# Patient Record
Sex: MALE | Race: WHITE | NOT HISPANIC OR LATINO
[De-identification: names, ages, dates, MRNs, and addresses within clinical notes are randomized per-mention and may not be internally consistent; named-entity substitution may affect disease eponyms.]

---

## 2017-06-22 PROBLEM — Z00.00 ENCOUNTER FOR PREVENTIVE HEALTH EXAMINATION: Status: ACTIVE | Noted: 2017-06-22

## 2017-06-23 ENCOUNTER — APPOINTMENT (OUTPATIENT)
Dept: DERMATOLOGY | Facility: CLINIC | Age: 67
End: 2017-06-23

## 2021-01-12 ENCOUNTER — APPOINTMENT (OUTPATIENT)
Dept: PULMONOLOGY | Facility: CLINIC | Age: 71
End: 2021-01-12
Payer: MEDICARE

## 2021-01-12 ENCOUNTER — RX CHANGE (OUTPATIENT)
Age: 71
End: 2021-01-12

## 2021-01-12 VITALS — HEART RATE: 65 BPM | OXYGEN SATURATION: 95 %

## 2021-01-12 VITALS
WEIGHT: 187 LBS | SYSTOLIC BLOOD PRESSURE: 126 MMHG | HEIGHT: 67 IN | BODY MASS INDEX: 29.35 KG/M2 | DIASTOLIC BLOOD PRESSURE: 70 MMHG

## 2021-01-12 DIAGNOSIS — Z82.5 FAMILY HISTORY OF ASTHMA AND OTHER CHRONIC LOWER RESPIRATORY DISEASES: ICD-10-CM

## 2021-01-12 DIAGNOSIS — R09.82 POSTNASAL DRIP: ICD-10-CM

## 2021-01-12 DIAGNOSIS — R09.89 OTHER SPECIFIED SYMPTOMS AND SIGNS INVOLVING THE CIRCULATORY AND RESPIRATORY SYSTEMS: ICD-10-CM

## 2021-01-12 DIAGNOSIS — I10 ESSENTIAL (PRIMARY) HYPERTENSION: ICD-10-CM

## 2021-01-12 DIAGNOSIS — I49.9 CARDIAC ARRHYTHMIA, UNSPECIFIED: ICD-10-CM

## 2021-01-12 DIAGNOSIS — Z80.42 FAMILY HISTORY OF MALIGNANT NEOPLASM OF PROSTATE: ICD-10-CM

## 2021-01-12 PROCEDURE — 99204 OFFICE O/P NEW MOD 45 MIN: CPT | Mod: 25

## 2021-01-12 PROCEDURE — G0296 VISIT TO DETERM LDCT ELIG: CPT

## 2021-01-12 RX ORDER — LOSARTAN POTASSIUM AND HYDROCHLOROTHIAZIDE 12.5; 1 MG/1; MG/1
100-12.5 TABLET ORAL
Refills: 0 | Status: ACTIVE | COMMUNITY

## 2021-01-12 RX ORDER — METOPROLOL TARTRATE 75 MG/1
75 TABLET, FILM COATED ORAL
Refills: 0 | Status: ACTIVE | COMMUNITY

## 2021-01-12 RX ORDER — PNV NO.95/FERROUS FUM/FOLIC AC 28MG-0.8MG
TABLET ORAL
Refills: 0 | Status: ACTIVE | COMMUNITY

## 2021-01-12 RX ORDER — AZELASTINE HYDROCHLORIDE AND FLUTICASONE PROPIONATE 137; 50 UG/1; UG/1
137-50 SPRAY, METERED NASAL TWICE DAILY
Qty: 1 | Refills: 5 | Status: DISCONTINUED | COMMUNITY
Start: 2021-01-12 | End: 2021-01-12

## 2021-01-12 NOTE — DISCUSSION/SUMMARY
[FreeTextEntry1] : 70-year-old male with a significant history of tobacco use, and questionable history of COPD, seen today for chronic cough over the last 3 months. Most likely etiology is that of underlying COPD complicated by possible sinus disease. Differential diagnosis does include cardiogenic carcinoma, interstitial lung disease. No evidence to suggest congestive heart failure

## 2021-01-12 NOTE — HISTORY OF PRESENT ILLNESS
[TextBox_4] : 70-year-old male with a greater than 75 pack year history of cigarette smoking. DCD 8 years ago, seen today for complaints of a chronic cough for the past 3 months. Patient noted the onset of cough with recent weather change to cold weather associated with mild shortness of breath since October. Skull for sporadic, productive of a clear white sputum with some associated postnasal drip. He does note slight change with position. Despite a history of ventricular arrhythmias. He has tried Primatene Mist which he states does improve his symptoms. Has no history of chest pains, palpitations, hemoptysis, leg edema, paroxysmal nocturnal dyspnea, or orthopnea. He has had no change in home environment or exposures. History is positive for a distant diagnosis of COPD alone, never treated, as well as pulmonary nodules seen on x-ray Many years ago [ESS] : 0

## 2021-01-12 NOTE — CONSULT LETTER
[Dear  ___] : Dear  [unfilled], [Consult Letter:] : I had the pleasure of evaluating your patient, [unfilled]. [Please see my note below.] : Please see my note below. [Consult Closing:] : Thank you very much for allowing me to participate in the care of this patient.  If you have any questions, please do not hesitate to contact me. [Sincerely,] : Sincerely, [FreeTextEntry3] : Peter Reyes MD FCCP\par Pulmonary/Critical Care/Sleep Medicine\par Department of Internal Medicine\par \par [facility]\par

## 2021-01-12 NOTE — COUNSELING
[FreeTextEntry2] : former smoker [ - Annual Lung Cancer Screening/Share Decision Making Discussion] : Annual Lung Cancer Screening/Share Decision Making Discussion. (I have advised this patient to have a Low Dose CT (LDCT) scan of the lungs and have discussed the following with the patient in a shared decision making discussion:   Benefits of Detection and Early Treatment: There is adequate evidence that annual screening for lung cancer with LDCT in a population of high-risk persons can prevent a substantial number of lung cancer–related deaths. The magnitude of benefit depends on the individual patient's risk for lung cancer, as those who are at highest risk are most likely to benefit. Screening cannot prevent most lung cancer–related deaths, and does not replace smoking cessation. Harms of Detection and Early Intervention and Treatment: The harms associated with LDCT screening include false-negative and false-positive results, incidental findings, over diagnosis, and radiation exposure. False-positive LDCT results occur in a substantial proportion of screened persons; 95% of all positive results do not lead to a diagnosis of cancer. In a high-quality screening program, further imaging can resolve most false-positive results; however, some patients may require invasive procedures. Radiation harms, including cancer resulting from cumulative exposure to radiation, vary depending on the age at the start of screening; the number of scans received; and the person's exposure to other sources of radiation, particularly other medical imaging.)

## 2021-01-13 ENCOUNTER — NON-APPOINTMENT (OUTPATIENT)
Age: 71
End: 2021-01-13

## 2021-01-13 VITALS — HEIGHT: 67 IN | BODY MASS INDEX: 29.03 KG/M2 | WEIGHT: 185 LBS

## 2021-01-13 NOTE — HISTORY OF PRESENT ILLNESS
[TextBox_13] : Referred by Dr. Peter Reyes.\par \par Mr. RAPHAEL is a 70 year old male with a history of HTN, COPD, s/p PPM/ICD for heart block and Vtach.\par \par He was called to review eligibility for Low-Dose CT lung cancer screening.  Reviewed and confirmed that the patient meets screening eligibility criteria:\par \par 70 years old \par \par Smoking Status: Former smoker\par \par Number of pack(s) per day: 1.5\par Number of years smoked: 45\par Number of pack years smokin.5\par \par Number of years since quitting smokin\par Quit year: \par \par Mr. RAPHAEL denies any symptoms of lung cancer, including new cough, change in cough, hemoptysis, and unintentional weight loss.\par \par Mr. RAPHAEL denies any personal history of lung cancer.  No lung cancer in a first degree relative.

## 2021-01-20 ENCOUNTER — OUTPATIENT (OUTPATIENT)
Dept: OUTPATIENT SERVICES | Facility: HOSPITAL | Age: 71
LOS: 1 days | End: 2021-01-20
Payer: MEDICARE

## 2021-01-20 ENCOUNTER — APPOINTMENT (OUTPATIENT)
Dept: CT IMAGING | Facility: CLINIC | Age: 71
End: 2021-01-20
Payer: MEDICARE

## 2021-01-20 ENCOUNTER — TRANSCRIPTION ENCOUNTER (OUTPATIENT)
Age: 71
End: 2021-01-20

## 2021-01-20 ENCOUNTER — RESULT REVIEW (OUTPATIENT)
Age: 71
End: 2021-01-20

## 2021-01-20 DIAGNOSIS — Z87.891 PERSONAL HISTORY OF NICOTINE DEPENDENCE: ICD-10-CM

## 2021-01-20 PROCEDURE — 71271 CT THORAX LUNG CANCER SCR C-: CPT | Mod: 26,MH

## 2021-01-20 PROCEDURE — 71271 CT THORAX LUNG CANCER SCR C-: CPT

## 2021-01-28 ENCOUNTER — APPOINTMENT (OUTPATIENT)
Dept: PULMONOLOGY | Facility: CLINIC | Age: 71
End: 2021-01-28
Payer: MEDICARE

## 2021-01-28 PROCEDURE — 99213 OFFICE O/P EST LOW 20 MIN: CPT | Mod: 95

## 2021-01-28 NOTE — ASSESSMENT
[FreeTextEntry1] : This could represent sinusitis or upper respiratory infection. I told him to take Mucinex to loosen his sputum but also I've prescribed doxycycline 100 mg b.i.d. for a week. He will be following up in this office shortly for pulmonary function studies.

## 2021-01-28 NOTE — HISTORY OF PRESENT ILLNESS
[Home] : at home, [unfilled] , at the time of the visit. [Medical Office: ([facility])___] : at the medical office located in  [Verbal consent obtained from patient] : the patient, [unfilled] [TextBox_4] : The patient has been complaining of ongoing cough. His nose is better on Flonase and his breathing is better with Combivent but the cough has continued. It's mostly yellow although there is occasional brown spots. He does have a history of sinus disease.\par \par His CT scan does show some areas of mucus impaction in the left upper lobe and the right lower lobe.\par \par

## 2021-02-19 DIAGNOSIS — Z01.818 ENCOUNTER FOR OTHER PREPROCEDURAL EXAMINATION: ICD-10-CM

## 2021-02-20 ENCOUNTER — APPOINTMENT (OUTPATIENT)
Dept: DISASTER EMERGENCY | Facility: CLINIC | Age: 71
End: 2021-02-20

## 2021-02-21 LAB — SARS-COV-2 N GENE NPH QL NAA+PROBE: NOT DETECTED

## 2021-02-24 ENCOUNTER — APPOINTMENT (OUTPATIENT)
Dept: PULMONOLOGY | Facility: CLINIC | Age: 71
End: 2021-02-24
Payer: MEDICARE

## 2021-02-24 VITALS — BODY MASS INDEX: 29.98 KG/M2 | HEIGHT: 67 IN | WEIGHT: 191 LBS | TEMPERATURE: 97.7 F

## 2021-02-24 PROCEDURE — 94010 BREATHING CAPACITY TEST: CPT

## 2021-02-24 PROCEDURE — 94727 GAS DIL/WSHOT DETER LNG VOL: CPT

## 2021-02-24 PROCEDURE — 85018 HEMOGLOBIN: CPT | Mod: QW

## 2021-02-24 PROCEDURE — 94729 DIFFUSING CAPACITY: CPT

## 2021-03-02 ENCOUNTER — APPOINTMENT (OUTPATIENT)
Dept: PULMONOLOGY | Facility: CLINIC | Age: 71
End: 2021-03-02
Payer: MEDICARE

## 2021-03-02 PROCEDURE — 99443: CPT | Mod: 95

## 2021-04-14 ENCOUNTER — APPOINTMENT (OUTPATIENT)
Dept: PULMONOLOGY | Facility: CLINIC | Age: 71
End: 2021-04-14
Payer: MEDICARE

## 2021-04-14 VITALS
HEART RATE: 62 BPM | SYSTOLIC BLOOD PRESSURE: 122 MMHG | DIASTOLIC BLOOD PRESSURE: 70 MMHG | WEIGHT: 194 LBS | BODY MASS INDEX: 30.38 KG/M2 | OXYGEN SATURATION: 95 %

## 2021-04-14 PROCEDURE — 99215 OFFICE O/P EST HI 40 MIN: CPT

## 2021-04-14 NOTE — DISCUSSION/SUMMARY
[COPD] : chronic obstructive pulmonary disease [Improving] : improving [Responding to Treatment] : responding to treatment [None] : There are no changes in medication management [Stage III (Severe)] : stage III (severe) [FreeTextEntry1] : Chest CT demonstrated a 3 mm nodule, as well as possible mucoid impacted airways. Followup CAT scan will be performed in 6 months. Doubt malignancy

## 2021-04-14 NOTE — CONSULT LETTER
[Dear  ___] : Dear  [unfilled], [Please see my note below.] : Please see my note below. [Consult Letter:] : I had the pleasure of evaluating your patient, [unfilled]. [Consult Closing:] : Thank you very much for allowing me to participate in the care of this patient.  If you have any questions, please do not hesitate to contact me. [Sincerely,] : Sincerely, [FreeTextEntry3] : Peter Reyes MD FCCP\par Pulmonary/Critical Care/Sleep Medicine\par Department of Internal Medicine\par \par [facility]\par

## 2021-04-14 NOTE — HISTORY OF PRESENT ILLNESS
[Doing Well] : doing well [Difficulty Breathing During Exertion] : improved dyspnea on exertion [Feelings Of Weakness On Exertion] : improved exercise intolerance [Cough] : denies coughing [Coughing Up Sputum] : denies coughing up sputum [Wheezing] : denies wheezing [Regional Soft Tissue Swelling Both Lower Extremities] : denies lower extremity edema [Adherent] : the patient is adherent with ~his/her~ medication regimen [None] : None [Former] : former [>= 30 pack years] : >= 30 pack years [de-identified] : Htn [YearQuit] : 2012

## 2021-07-09 ENCOUNTER — APPOINTMENT (OUTPATIENT)
Dept: DISASTER EMERGENCY | Facility: CLINIC | Age: 71
End: 2021-07-09

## 2021-07-12 ENCOUNTER — OUTPATIENT (OUTPATIENT)
Dept: OUTPATIENT SERVICES | Facility: HOSPITAL | Age: 71
LOS: 1 days | End: 2021-07-12
Payer: MEDICARE

## 2021-07-12 ENCOUNTER — APPOINTMENT (OUTPATIENT)
Dept: CT IMAGING | Facility: CLINIC | Age: 71
End: 2021-07-12
Payer: MEDICARE

## 2021-07-12 DIAGNOSIS — R91.8 OTHER NONSPECIFIC ABNORMAL FINDING OF LUNG FIELD: ICD-10-CM

## 2021-07-12 PROCEDURE — 71250 CT THORAX DX C-: CPT | Mod: 26,ME

## 2021-07-12 PROCEDURE — G1004: CPT

## 2021-07-12 PROCEDURE — 71250 CT THORAX DX C-: CPT

## 2021-07-13 ENCOUNTER — NON-APPOINTMENT (OUTPATIENT)
Age: 71
End: 2021-07-13

## 2021-07-14 ENCOUNTER — APPOINTMENT (OUTPATIENT)
Dept: PULMONOLOGY | Facility: CLINIC | Age: 71
End: 2021-07-14
Payer: MEDICARE

## 2021-07-14 VITALS — SYSTOLIC BLOOD PRESSURE: 120 MMHG | DIASTOLIC BLOOD PRESSURE: 68 MMHG | HEART RATE: 61 BPM | OXYGEN SATURATION: 96 %

## 2021-07-14 VITALS — WEIGHT: 196 LBS | HEIGHT: 67 IN | BODY MASS INDEX: 30.76 KG/M2

## 2021-07-14 PROCEDURE — 94010 BREATHING CAPACITY TEST: CPT

## 2021-07-14 PROCEDURE — 99215 OFFICE O/P EST HI 40 MIN: CPT | Mod: 25

## 2021-07-14 NOTE — HISTORY OF PRESENT ILLNESS
[Former] : former [>= 30 pack years] : >= 30 pack years [Doing Well] : doing well [Difficulty Breathing During Exertion] : improved dyspnea on exertion [Feelings Of Weakness On Exertion] : improved exercise intolerance [Cough] : denies coughing [Coughing Up Sputum] : denies coughing up sputum [Wheezing] : denies wheezing [Regional Soft Tissue Swelling Both Lower Extremities] : denies lower extremity edema [None] : None [Adherent] : the patient is adherent with ~his/her~ medication regimen [YearQuit] : 2012 [de-identified] : Htn

## 2021-07-14 NOTE — DISCUSSION/SUMMARY
[COPD] : chronic obstructive pulmonary disease [Improving] : improving [Responding to Treatment] : responding to treatment [Stage III (Severe)] : stage III (severe) [None] : There are no changes in medication management [FreeTextEntry1] : CCT  demonstrates new RUL lesion probably inflammatory. Short term f/u 3 months

## 2021-07-14 NOTE — CONSULT LETTER
[Dear  ___] : Dear  [unfilled], [Consult Letter:] : I had the pleasure of evaluating your patient, [unfilled]. [Please see my note below.] : Please see my note below. [Consult Closing:] : Thank you very much for allowing me to participate in the care of this patient.  If you have any questions, please do not hesitate to contact me. [Sincerely,] : Sincerely, [FreeTextEntry3] : Peter Reyes MD FCCP\par Pulmonary/Critical Care/Sleep Medicine\par Department of Internal Medicine\par \par [facility]\par

## 2021-10-14 ENCOUNTER — OUTPATIENT (OUTPATIENT)
Dept: OUTPATIENT SERVICES | Facility: HOSPITAL | Age: 71
LOS: 1 days | End: 2021-10-14

## 2021-10-14 ENCOUNTER — APPOINTMENT (OUTPATIENT)
Dept: CT IMAGING | Facility: CLINIC | Age: 71
End: 2021-10-14
Payer: MEDICARE

## 2021-10-14 ENCOUNTER — NON-APPOINTMENT (OUTPATIENT)
Age: 71
End: 2021-10-14

## 2021-10-14 DIAGNOSIS — J44.9 CHRONIC OBSTRUCTIVE PULMONARY DISEASE, UNSPECIFIED: ICD-10-CM

## 2021-10-14 PROCEDURE — 71250 CT THORAX DX C-: CPT | Mod: 26

## 2022-01-18 ENCOUNTER — APPOINTMENT (OUTPATIENT)
Dept: PULMONOLOGY | Facility: CLINIC | Age: 72
End: 2022-01-18
Payer: MEDICARE

## 2022-01-18 VITALS
RESPIRATION RATE: 16 BRPM | SYSTOLIC BLOOD PRESSURE: 140 MMHG | BODY MASS INDEX: 31.01 KG/M2 | WEIGHT: 198 LBS | HEART RATE: 67 BPM | OXYGEN SATURATION: 96 % | DIASTOLIC BLOOD PRESSURE: 70 MMHG

## 2022-01-18 PROCEDURE — 99215 OFFICE O/P EST HI 40 MIN: CPT | Mod: 25

## 2022-01-18 PROCEDURE — G0296 VISIT TO DETERM LDCT ELIG: CPT

## 2022-01-18 RX ORDER — IPRATROPIUM BROMIDE AND ALBUTEROL 20; 100 UG/1; UG/1
20-100 SPRAY, METERED RESPIRATORY (INHALATION) 4 TIMES DAILY
Qty: 1 | Refills: 5 | Status: DISCONTINUED | COMMUNITY
Start: 2021-01-12 | End: 2022-01-18

## 2022-01-18 NOTE — HISTORY OF PRESENT ILLNESS
[Former] : former [>= 30 pack years] : >= 30 pack years [Lung Cancer Screening] : Patient underwent lung cancer screening [4A] : 4A   [2] : 2 [Doing Well] : doing well [Cough] : denies coughing [Coughing Up Sputum] : denies coughing up sputum [Wheezing] : denies wheezing [Regional Soft Tissue Swelling Both Lower Extremities] : denies lower extremity edema [None] : None [Adherent] : the patient is adherent with ~his/her~ medication regimen [Difficulty Breathing During Exertion] : stable dyspnea on exertion [Feelings Of Weakness On Exertion] : stable exercise intolerance [YearQuit] : 2012 [TextBox_12] : 1/20/2021 [TextBox_27] : 7/13/2021 [TextBox_42] : 10/14/21 [TextBox_57] : , Interval resolution of tree-in-bud opacities within the left upper lobe and subsolid opacity within the right upper lobe.  Few central lobular micronodules representing impacted distal airways.  Stable small fissural subpleural nodules. [de-identified] : Htn [de-identified] : two flu-like illnesses since last seen.

## 2022-01-18 NOTE — CONSULT LETTER
[Dear  ___] : Dear  [unfilled], [Consult Letter:] : I had the pleasure of evaluating your patient, [unfilled]. [Please see my note below.] : Please see my note below. [Consult Closing:] : Thank you very much for allowing me to participate in the care of this patient.  If you have any questions, please do not hesitate to contact me. [Sincerely,] : Sincerely, [FreeTextEntry3] : Peter Reyes MD FCCP\par Pulmonary/Critical Care/Sleep Medicine\par Department of Internal Medicine\par \par [facility]\par

## 2022-01-18 NOTE — DISCUSSION/SUMMARY
[COPD] : chronic obstructive pulmonary disease [Stable] : stable [Responding to Treatment] : responding to treatment [Stage III (Severe)] : stage III (severe) [None] : There are no changes in medication management [FreeTextEntry1] : Chest CT demonstrates resolution of previously seen infiltrates.  Emphysema and pulmonary nodules are stable.  Most likely postinflammatory in nature\par \par Due to the patient's history of prior tobacco use they fulfill criteria for low dose, lung cancer screening. This method was described to the patient with criteria for greater than 20 pack years of smoking, over the age of 50, and screening for 15 years following cessation of tobacco use or until age 80\par

## 2022-02-16 ENCOUNTER — RX RENEWAL (OUTPATIENT)
Age: 72
End: 2022-02-16

## 2022-07-11 ENCOUNTER — APPOINTMENT (OUTPATIENT)
Dept: PULMONOLOGY | Facility: CLINIC | Age: 72
End: 2022-07-11

## 2022-07-11 VITALS — SYSTOLIC BLOOD PRESSURE: 138 MMHG | DIASTOLIC BLOOD PRESSURE: 74 MMHG | HEART RATE: 62 BPM | OXYGEN SATURATION: 98 %

## 2022-07-11 VITALS — BODY MASS INDEX: 31.39 KG/M2 | HEIGHT: 67 IN | WEIGHT: 200 LBS

## 2022-07-11 LAB — SARS-COV-2 N GENE NPH QL NAA+PROBE: NOT DETECTED

## 2022-07-11 PROCEDURE — 94729 DIFFUSING CAPACITY: CPT

## 2022-07-11 PROCEDURE — 85018 HEMOGLOBIN: CPT | Mod: QW

## 2022-07-11 PROCEDURE — 94010 BREATHING CAPACITY TEST: CPT

## 2022-07-11 PROCEDURE — 99214 OFFICE O/P EST MOD 30 MIN: CPT | Mod: 25

## 2022-07-11 PROCEDURE — 94727 GAS DIL/WSHOT DETER LNG VOL: CPT

## 2022-07-11 NOTE — CONSULT LETTER
[Dear  ___] : Dear  [unfilled], [Consult Letter:] : I had the pleasure of evaluating your patient, [unfilled]. [Please see my note below.] : Please see my note below. [Consult Closing:] : Thank you very much for allowing me to participate in the care of this patient.  If you have any questions, please do not hesitate to contact me. [Sincerely,] : Sincerely, [FreeTextEntry3] : Peter Reyes MD FCCP\par Pulmonary/Critical Care/Sleep Medicine\par Department of Internal Medicine\par \par [facility]\par

## 2022-07-11 NOTE — HISTORY OF PRESENT ILLNESS
[Former] : former [Lung Cancer Screening] : Patient underwent lung cancer screening [4A] : 4A   [2] : 2 [Doing Well] : doing well [Difficulty Breathing During Exertion] : stable dyspnea on exertion [Feelings Of Weakness On Exertion] : stable exercise intolerance [Cough] : denies coughing [Coughing Up Sputum] : denies coughing up sputum [Wheezing] : denies wheezing [Regional Soft Tissue Swelling Both Lower Extremities] : denies lower extremity edema [None] : None [Adherent] : the patient is adherent with ~his/her~ medication regimen [YearQuit] : 2012 [TextBox_12] : 1/20/2021 [TextBox_27] : 7/13/2021 [TextBox_42] : 10/14/21 [TextBox_57] : , Interval resolution of tree-in-bud opacities within the left upper lobe and subsolid opacity within the right upper lobe.  Few central lobular micronodules representing impacted distal airways.  Stable small fissural subpleural nodules. [de-identified] : Htn [de-identified] : two flu-like illnesses since last seen.

## 2022-10-12 ENCOUNTER — NON-APPOINTMENT (OUTPATIENT)
Age: 72
End: 2022-10-12

## 2022-10-12 VITALS — BODY MASS INDEX: 31.39 KG/M2 | WEIGHT: 200 LBS | HEIGHT: 67 IN

## 2022-10-12 NOTE — HISTORY OF PRESENT ILLNESS
[Former] : Former [TextBox_13] : Referred by Dr. Peter Reyes.\par \par Mr. RAPHAEL is a 72 year old male with a history of HTN, COPD, s/p PPM/ICD.  Reviewed and confirmed that the patient meets screening eligibility criteria:\par \par 72 years old \par \par Smoking Status: Former smoker \par \par Number of pack(s) per day: 1.5\par Number of years smoked: 45\par Number of pack years smokin\par \par Number of years since quitting smoking: 10\par Quit year: \par \par No symptoms of lung cancer, including new cough, change in cough, hemoptysis, and unintentional weight loss.\par \par No personal history of lung cancer.  No lung cancer in a first degree relative. No history of lung disease or occupational exposures. [YearQuit] : 2012 [PacksperDay] : 1.5 [N_Years] : 45 [PacksperYear] : 68

## 2022-10-18 ENCOUNTER — APPOINTMENT (OUTPATIENT)
Dept: CT IMAGING | Facility: CLINIC | Age: 72
End: 2022-10-18

## 2022-10-18 ENCOUNTER — OUTPATIENT (OUTPATIENT)
Dept: OUTPATIENT SERVICES | Facility: HOSPITAL | Age: 72
LOS: 1 days | End: 2022-10-18

## 2022-10-18 DIAGNOSIS — Z87.891 PERSONAL HISTORY OF NICOTINE DEPENDENCE: ICD-10-CM

## 2022-10-18 PROCEDURE — 71271 CT THORAX LUNG CANCER SCR C-: CPT | Mod: 26

## 2022-10-24 ENCOUNTER — NON-APPOINTMENT (OUTPATIENT)
Age: 72
End: 2022-10-24

## 2022-10-25 ENCOUNTER — RX RENEWAL (OUTPATIENT)
Age: 72
End: 2022-10-25

## 2023-01-01 ENCOUNTER — RX CHANGE (OUTPATIENT)
Age: 73
End: 2023-01-01

## 2023-01-01 ENCOUNTER — APPOINTMENT (OUTPATIENT)
Dept: PULMONOLOGY | Facility: CLINIC | Age: 73
End: 2023-01-01
Payer: MEDICARE

## 2023-01-01 ENCOUNTER — APPOINTMENT (OUTPATIENT)
Dept: ORTHOPEDIC SURGERY | Facility: CLINIC | Age: 73
End: 2023-01-01
Payer: MEDICARE

## 2023-01-01 ENCOUNTER — NON-APPOINTMENT (OUTPATIENT)
Age: 73
End: 2023-01-01

## 2023-01-01 ENCOUNTER — OUTPATIENT (OUTPATIENT)
Dept: OUTPATIENT SERVICES | Facility: HOSPITAL | Age: 73
LOS: 1 days | End: 2023-01-01

## 2023-01-01 ENCOUNTER — APPOINTMENT (OUTPATIENT)
Dept: CT IMAGING | Facility: CLINIC | Age: 73
End: 2023-01-01
Payer: MEDICARE

## 2023-01-01 VITALS — BODY MASS INDEX: 29.4 KG/M2 | HEIGHT: 68 IN | WEIGHT: 194 LBS

## 2023-01-01 VITALS
HEART RATE: 60 BPM | DIASTOLIC BLOOD PRESSURE: 68 MMHG | SYSTOLIC BLOOD PRESSURE: 130 MMHG | OXYGEN SATURATION: 95 % | RESPIRATION RATE: 16 BRPM

## 2023-01-01 VITALS — HEIGHT: 68 IN | WEIGHT: 194 LBS | BODY MASS INDEX: 29.4 KG/M2

## 2023-01-01 DIAGNOSIS — M25.512 PAIN IN LEFT SHOULDER: ICD-10-CM

## 2023-01-01 DIAGNOSIS — R91.8 OTHER NONSPECIFIC ABNORMAL FINDING OF LUNG FIELD: ICD-10-CM

## 2023-01-01 DIAGNOSIS — M75.90 BURSITIS OF UNSPECIFIED SHOULDER: ICD-10-CM

## 2023-01-01 DIAGNOSIS — J44.9 CHRONIC OBSTRUCTIVE PULMONARY DISEASE, UNSPECIFIED: ICD-10-CM

## 2023-01-01 DIAGNOSIS — M75.50 BURSITIS OF UNSPECIFIED SHOULDER: ICD-10-CM

## 2023-01-01 PROCEDURE — 99214 OFFICE O/P EST MOD 30 MIN: CPT | Mod: 25

## 2023-01-01 PROCEDURE — 99204 OFFICE O/P NEW MOD 45 MIN: CPT | Mod: 25

## 2023-01-01 PROCEDURE — G0296 VISIT TO DETERM LDCT ELIG: CPT

## 2023-01-01 PROCEDURE — 94010 BREATHING CAPACITY TEST: CPT

## 2023-01-01 PROCEDURE — 71271 CT THORAX LUNG CANCER SCR C-: CPT | Mod: 26

## 2023-01-01 PROCEDURE — 73030 X-RAY EXAM OF SHOULDER: CPT | Mod: LT

## 2023-01-01 PROCEDURE — 20610 DRAIN/INJ JOINT/BURSA W/O US: CPT | Mod: LT

## 2023-01-01 RX ORDER — FLUTICASONE PROPIONATE 50 UG/1
50 SPRAY, METERED NASAL DAILY
Qty: 3 | Refills: 3 | Status: DISCONTINUED | COMMUNITY
Start: 2023-01-01 | End: 2023-01-01

## 2023-01-01 RX ORDER — MOMETASONE 50 UG/1
50 SPRAY, METERED NASAL
Qty: 3 | Refills: 0 | Status: ACTIVE | COMMUNITY
Start: 1900-01-01 | End: 1900-01-01

## 2023-01-01 RX ORDER — MOMETASONE 50 UG/1
50 SPRAY, METERED NASAL
Qty: 1 | Refills: 5 | Status: DISCONTINUED | COMMUNITY
Start: 2023-01-01 | End: 2023-01-01

## 2023-01-01 RX ORDER — ALBUTEROL SULFATE 90 UG/1
108 (90 BASE) INHALANT RESPIRATORY (INHALATION) EVERY 4 HOURS
Qty: 3 | Refills: 3 | Status: ACTIVE | COMMUNITY
Start: 2022-01-18 | End: 1900-01-01

## 2023-01-01 RX ORDER — FLUTICASONE FUROATE, UMECLIDINIUM BROMIDE AND VILANTEROL TRIFENATATE 100; 62.5; 25 UG/1; UG/1; UG/1
100-62.5-25 POWDER RESPIRATORY (INHALATION)
Qty: 3 | Refills: 3 | Status: ACTIVE | COMMUNITY
Start: 2021-03-02 | End: 1900-01-01

## 2023-01-01 RX ORDER — FLUTICASONE PROPIONATE 50 UG/1
50 SPRAY, METERED NASAL
Qty: 48 | Refills: 3 | Status: ACTIVE | COMMUNITY
Start: 1900-01-01 | End: 1900-01-01

## 2023-01-10 ENCOUNTER — APPOINTMENT (OUTPATIENT)
Dept: PULMONOLOGY | Facility: CLINIC | Age: 73
End: 2023-01-10
Payer: MEDICARE

## 2023-01-10 VITALS — WEIGHT: 199 LBS | BODY MASS INDEX: 31.17 KG/M2

## 2023-01-10 VITALS — HEART RATE: 60 BPM | OXYGEN SATURATION: 96 % | RESPIRATION RATE: 16 BRPM

## 2023-01-10 VITALS — SYSTOLIC BLOOD PRESSURE: 120 MMHG | DIASTOLIC BLOOD PRESSURE: 70 MMHG

## 2023-01-10 PROCEDURE — 99215 OFFICE O/P EST HI 40 MIN: CPT

## 2023-01-10 RX ORDER — AZELASTINE HYDROCHLORIDE 137 UG/1
137 SPRAY, METERED NASAL DAILY
Qty: 3 | Refills: 3 | Status: COMPLETED | COMMUNITY
Start: 2021-01-12 | End: 2023-01-10

## 2023-01-10 RX ORDER — DOXYCYCLINE HYCLATE 100 MG/1
100 CAPSULE ORAL
Qty: 14 | Refills: 0 | Status: COMPLETED | COMMUNITY
Start: 2021-01-28 | End: 2023-01-10

## 2023-01-10 RX ORDER — FLUTICASONE PROPIONATE 50 UG/1
50 SPRAY, METERED NASAL DAILY
Qty: 3 | Refills: 3 | Status: COMPLETED | COMMUNITY
Start: 2021-01-12 | End: 2023-01-10

## 2023-01-10 NOTE — HISTORY OF PRESENT ILLNESS
[Lung Cancer Screening] : Patient underwent lung cancer screening [4A] : 4A   [2] : 2 [Doing Well] : doing well [Difficulty Breathing During Exertion] : stable dyspnea on exertion [Feelings Of Weakness On Exertion] : stable exercise intolerance [Cough] : denies coughing [Coughing Up Sputum] : denies coughing up sputum [Wheezing] : denies wheezing [Regional Soft Tissue Swelling Both Lower Extremities] : denies lower extremity edema [None] : None [Adherent] : the patient is adherent with ~his/her~ medication regimen [>= 20 pack years] : >= 20 pack years [YearQuit] : 2013 [TextBox_12] : 1/20/2021 [TextBox_27] : 7/13/2021 [TextBox_42] : 10/18/2022 [TextBox_52] : 4 [TextBox_57] : Emphysema, new region of tree-in-bud nodules in the anterior right upper lobe associated with bronchial impaction.  Unchanged scattered nodules less than 6 mm unchanged.  On most recent CAT scan [de-identified] : Htn

## 2023-01-10 NOTE — CONSULT LETTER
[Dear  ___] : Dear  [unfilled], [Consult Letter:] : I had the pleasure of evaluating your patient, [unfilled]. [Please see my note below.] : Please see my note below. [Consult Closing:] : Thank you very much for allowing me to participate in the care of this patient.  If you have any questions, please do not hesitate to contact me. [Sincerely,] : Sincerely, [FreeTextEntry3] : Peter Reyes MD FCCP\par Pulmonary/Critical Care/Sleep Medicine\par Department of Internal Medicine\par \par [facility] [employer]\par

## 2023-10-10 PROBLEM — R91.8 MULTIPLE PULMONARY NODULES: Status: ACTIVE | Noted: 2021-01-12

## 2023-10-10 PROBLEM — J44.9 COPD, SEVERE: Status: ACTIVE | Noted: 2021-03-02

## 2023-12-21 PROBLEM — M25.512 LEFT SHOULDER PAIN, UNSPECIFIED CHRONICITY: Status: ACTIVE | Noted: 2023-01-01

## 2023-12-22 PROBLEM — M25.512 LEFT SHOULDER PAIN: Status: ACTIVE | Noted: 2023-01-01

## 2023-12-22 PROBLEM — M75.50 BURSITIS AND TENDINITIS OF SHOULDER REGION: Status: ACTIVE | Noted: 2023-01-01

## 2023-12-22 NOTE — PHYSICAL EXAM
[de-identified] : Examination of the [left] shoulder reveals equal active and passive motion as compared to the contralateral side There is a positive Speed, positive Uday, positive Lindsey, tenderness with anterior shoulder compression 3/5 weakness [de-identified] : [4] views of [left] shoulder were obtained today in my office and were seen by me and discussed with the patient.  These [show findings consistent with AC arthropathy and mild to moderate joint space narrowing

## 2023-12-22 NOTE — ASSESSMENT
[FreeTextEntry1] : ASSESSMENT: [The patient was accompanied today and was assisted with explaining their complaints today.] The patient comes in today with chronic severely exacerbated symptoms of left shoulder pain tendinopathy impingement and tremendous weakness.  We have discussed his findings clinically and radiographically.  At this point in time we have discussed several treatment modalities and he elects for physical therapy and an injection   The patient was adequately and thoroughly informed of my assessment of their current condition(s).  - This may diminish bodily function for the extremity. We discussed prognosis, tx modalities including operative and nonoperative options for the above diagnostic assessment. As always, 2nd opinion is always provided as an option.  When accessible, I was able to review other physicians note(s) including reviewing other tests, imaging results as well as personally view these results for my own interpretation.   [left] SUBACROMIAL SHOULDER INJECTION   Indication:  subacromial bursitis/impingement, pain   Risk, benefits and alternatives were discussed with the patient. Potential complications include bleeding and infection. Alcohol was used to prep the area.  Ethyl chloride spray was used as a topical anesthetic.  Using sterile technique, the injection needle was then directed from a standard posterior approach parallel to and inferior to the acromion. A 21g needle was used to inject 5 mL of 1% Lidocaine and 2 mL Kenalog.  No significant resistance was encountered.   A bandage was applied.  The patient tolerated the procedure well.     Patient instructed to avoid strenuous activity for 2 day(s). Specifically counseled regarding the signs and symptoms of potential infection and instructed to present promptly to clinic or hospital if such signs and symptoms arise. The patient was adequately and thoroughly informed of my assessment of their current condition(s).  DISCUSSION: 1.  Left shoulder injection.  PT prescription provided.  Follow-up 2 months 2. [x] 3. [x]

## 2023-12-22 NOTE — HISTORY OF PRESENT ILLNESS
[FreeTextEntry1] : Franky is a very pleasant 73-year-old male who presents today with a greater than 1 year history of worsening left shoulder discomfort and difficulty with activities day and night including his hobbies of flying and boating.

## 2024-01-01 ENCOUNTER — TRANSCRIPTION ENCOUNTER (OUTPATIENT)
Age: 74
End: 2024-01-01

## 2024-01-01 ENCOUNTER — APPOINTMENT (OUTPATIENT)
Dept: THORACIC SURGERY | Facility: CLINIC | Age: 74
End: 2024-01-01
Payer: MEDICARE

## 2024-01-01 ENCOUNTER — INPATIENT (INPATIENT)
Facility: HOSPITAL | Age: 74
LOS: 0 days | DRG: 208 | End: 2024-03-14
Attending: HOSPITALIST | Admitting: STUDENT IN AN ORGANIZED HEALTH CARE EDUCATION/TRAINING PROGRAM
Payer: MEDICARE

## 2024-01-01 ENCOUNTER — APPOINTMENT (OUTPATIENT)
Dept: NUCLEAR MEDICINE | Facility: CLINIC | Age: 74
End: 2024-01-01
Payer: COMMERCIAL

## 2024-01-01 ENCOUNTER — OUTPATIENT (OUTPATIENT)
Dept: OUTPATIENT SERVICES | Facility: HOSPITAL | Age: 74
LOS: 1 days | Discharge: HOME | End: 2024-01-01

## 2024-01-01 ENCOUNTER — OUTPATIENT (OUTPATIENT)
Dept: OUTPATIENT SERVICES | Facility: HOSPITAL | Age: 74
LOS: 1 days | End: 2024-01-01
Payer: MEDICARE

## 2024-01-01 ENCOUNTER — APPOINTMENT (OUTPATIENT)
Dept: PULMONOLOGY | Facility: CLINIC | Age: 74
End: 2024-01-01
Payer: MEDICARE

## 2024-01-01 ENCOUNTER — INPATIENT (INPATIENT)
Facility: HOSPITAL | Age: 74
LOS: 5 days | Discharge: HOME | DRG: 180 | End: 2024-02-29
Attending: STUDENT IN AN ORGANIZED HEALTH CARE EDUCATION/TRAINING PROGRAM | Admitting: STUDENT IN AN ORGANIZED HEALTH CARE EDUCATION/TRAINING PROGRAM
Payer: MEDICARE

## 2024-01-01 ENCOUNTER — NON-APPOINTMENT (OUTPATIENT)
Age: 74
End: 2024-01-01

## 2024-01-01 ENCOUNTER — APPOINTMENT (OUTPATIENT)
Dept: ULTRASOUND IMAGING | Facility: CLINIC | Age: 74
End: 2024-01-01
Payer: MEDICARE

## 2024-01-01 ENCOUNTER — APPOINTMENT (OUTPATIENT)
Dept: CT IMAGING | Facility: CLINIC | Age: 74
End: 2024-01-01
Payer: MEDICARE

## 2024-01-01 ENCOUNTER — APPOINTMENT (OUTPATIENT)
Dept: PULMONOLOGY | Facility: CLINIC | Age: 74
End: 2024-01-01

## 2024-01-01 ENCOUNTER — APPOINTMENT (OUTPATIENT)
Dept: THORACIC SURGERY | Facility: HOSPITAL | Age: 74
End: 2024-01-01

## 2024-01-01 ENCOUNTER — OUTPATIENT (OUTPATIENT)
Dept: OUTPATIENT SERVICES | Facility: HOSPITAL | Age: 74
LOS: 1 days | End: 2024-01-01

## 2024-01-01 ENCOUNTER — RESULT REVIEW (OUTPATIENT)
Age: 74
End: 2024-01-01

## 2024-01-01 VITALS
SYSTOLIC BLOOD PRESSURE: 131 MMHG | TEMPERATURE: 98 F | DIASTOLIC BLOOD PRESSURE: 77 MMHG | HEART RATE: 96 BPM | OXYGEN SATURATION: 89 % | RESPIRATION RATE: 19 BRPM

## 2024-01-01 VITALS
OXYGEN SATURATION: 89 % | HEIGHT: 69 IN | HEART RATE: 72 BPM | SYSTOLIC BLOOD PRESSURE: 154 MMHG | WEIGHT: 190 LBS | TEMPERATURE: 98 F | DIASTOLIC BLOOD PRESSURE: 96 MMHG | BODY MASS INDEX: 28.14 KG/M2 | RESPIRATION RATE: 16 BRPM

## 2024-01-01 VITALS
DIASTOLIC BLOOD PRESSURE: 81 MMHG | SYSTOLIC BLOOD PRESSURE: 153 MMHG | TEMPERATURE: 98 F | WEIGHT: 190.04 LBS | HEART RATE: 77 BPM | OXYGEN SATURATION: 92 % | RESPIRATION RATE: 18 BRPM

## 2024-01-01 VITALS — HEART RATE: 119 BPM | RESPIRATION RATE: 24 BRPM

## 2024-01-01 VITALS
RESPIRATION RATE: 20 BRPM | SYSTOLIC BLOOD PRESSURE: 159 MMHG | TEMPERATURE: 98 F | DIASTOLIC BLOOD PRESSURE: 87 MMHG | HEART RATE: 72 BPM | WEIGHT: 199.96 LBS | OXYGEN SATURATION: 93 %

## 2024-01-01 VITALS
DIASTOLIC BLOOD PRESSURE: 80 MMHG | OXYGEN SATURATION: 92 % | RESPIRATION RATE: 16 BRPM | HEART RATE: 69 BPM | SYSTOLIC BLOOD PRESSURE: 160 MMHG

## 2024-01-01 VITALS — WEIGHT: 190 LBS | BODY MASS INDEX: 28.79 KG/M2 | HEIGHT: 68 IN

## 2024-01-01 DIAGNOSIS — C34.90 MALIGNANT NEOPLASM OF UNSPECIFIED PART OF UNSPECIFIED BRONCHUS OR LUNG: ICD-10-CM

## 2024-01-01 DIAGNOSIS — R93.89 ABNORMAL FINDINGS ON DIAGNOSTIC IMAGING OF OTHER SPECIFIED BODY STRUCTURES: ICD-10-CM

## 2024-01-01 DIAGNOSIS — R79.89 OTHER SPECIFIED ABNORMAL FINDINGS OF BLOOD CHEMISTRY: ICD-10-CM

## 2024-01-01 DIAGNOSIS — R04.2 HEMOPTYSIS: ICD-10-CM

## 2024-01-01 DIAGNOSIS — J44.9 CHRONIC OBSTRUCTIVE PULMONARY DISEASE, UNSPECIFIED: ICD-10-CM

## 2024-01-01 DIAGNOSIS — J44.1 CHRONIC OBSTRUCTIVE PULMONARY DISEASE WITH (ACUTE) EXACERBATION: ICD-10-CM

## 2024-01-01 DIAGNOSIS — Z98.890 OTHER SPECIFIED POSTPROCEDURAL STATES: Chronic | ICD-10-CM

## 2024-01-01 DIAGNOSIS — R91.8 OTHER NONSPECIFIC ABNORMAL FINDING OF LUNG FIELD: ICD-10-CM

## 2024-01-01 DIAGNOSIS — I10 ESSENTIAL (PRIMARY) HYPERTENSION: Chronic | ICD-10-CM

## 2024-01-01 DIAGNOSIS — Z87.891 PERSONAL HISTORY OF NICOTINE DEPENDENCE: ICD-10-CM

## 2024-01-01 DIAGNOSIS — J98.59 OTHER DISEASES OF MEDIASTINUM, NOT ELSEWHERE CLASSIFIED: ICD-10-CM

## 2024-01-01 DIAGNOSIS — Z78.9 OTHER SPECIFIED HEALTH STATUS: ICD-10-CM

## 2024-01-01 DIAGNOSIS — J96.21 ACUTE AND CHRONIC RESPIRATORY FAILURE WITH HYPOXIA: ICD-10-CM

## 2024-01-01 DIAGNOSIS — R05.3 CHRONIC COUGH: ICD-10-CM

## 2024-01-01 DIAGNOSIS — Z80.8 FAMILY HISTORY OF MALIGNANT NEOPLASM OF OTHER ORGANS OR SYSTEMS: ICD-10-CM

## 2024-01-01 DIAGNOSIS — J18.9 PNEUMONIA, UNSPECIFIED ORGANISM: ICD-10-CM

## 2024-01-01 LAB
24R-OH-CALCIDIOL SERPL-MCNC: 44.1 PG/ML
25(OH)D3 SERPL-MCNC: 28.5 NG/ML
ABO RH CONFIRMATION: SIGNIFICANT CHANGE UP
ALBUMIN SERPL ELPH-MCNC: 3.5 G/DL — SIGNIFICANT CHANGE UP (ref 3.3–5.2)
ALBUMIN SERPL ELPH-MCNC: 4 G/DL — SIGNIFICANT CHANGE UP (ref 3.3–5.2)
ALBUMIN SERPL ELPH-MCNC: 4.1 G/DL — SIGNIFICANT CHANGE UP (ref 3.3–5.2)
ALBUMIN SERPL ELPH-MCNC: 4.1 G/DL — SIGNIFICANT CHANGE UP (ref 3.3–5.2)
ALP SERPL-CCNC: 58 U/L — SIGNIFICANT CHANGE UP (ref 40–120)
ALP SERPL-CCNC: 69 U/L — SIGNIFICANT CHANGE UP (ref 40–120)
ALP SERPL-CCNC: 77 U/L — SIGNIFICANT CHANGE UP (ref 40–120)
ALP SERPL-CCNC: 79 U/L — SIGNIFICANT CHANGE UP (ref 40–120)
ALT FLD-CCNC: 17 U/L — SIGNIFICANT CHANGE UP
ALT FLD-CCNC: 17 U/L — SIGNIFICANT CHANGE UP
ALT FLD-CCNC: 24 U/L — SIGNIFICANT CHANGE UP
ALT FLD-CCNC: 40 U/L — SIGNIFICANT CHANGE UP
ANION GAP SERPL CALC-SCNC: 12 MMOL/L — SIGNIFICANT CHANGE UP (ref 5–17)
ANION GAP SERPL CALC-SCNC: 13 MMOL/L — SIGNIFICANT CHANGE UP (ref 5–17)
ANION GAP SERPL CALC-SCNC: 15 MMOL/L — SIGNIFICANT CHANGE UP (ref 5–17)
ANION GAP SERPL CALC-SCNC: 16 MMOL/L — SIGNIFICANT CHANGE UP (ref 5–17)
ANION GAP SERPL CALC-SCNC: 17 MMOL/L — SIGNIFICANT CHANGE UP (ref 5–17)
ANION GAP SERPL CALC-SCNC: 20 MMOL/L — HIGH (ref 5–17)
ANION GAP SERPL CALC-SCNC: 23 MMOL/L — HIGH (ref 5–17)
APPEARANCE UR: CLEAR — SIGNIFICANT CHANGE UP
APTT BLD: 177.1 SEC — CRITICAL HIGH (ref 24.5–35.6)
APTT BLD: 23.6 SEC — LOW (ref 24.5–35.6)
APTT BLD: 37.5 SEC — HIGH (ref 24.5–35.6)
AST SERPL-CCNC: 22 U/L — SIGNIFICANT CHANGE UP
AST SERPL-CCNC: 26 U/L — SIGNIFICANT CHANGE UP
AST SERPL-CCNC: 27 U/L — SIGNIFICANT CHANGE UP
AST SERPL-CCNC: 43 U/L — HIGH
B PERT IGG+IGM PNL SER: ABNORMAL
BASE EXCESS BLDV CALC-SCNC: 0.1 MMOL/L — SIGNIFICANT CHANGE UP (ref -2–3)
BASOPHILS # BLD AUTO: 0 K/UL — SIGNIFICANT CHANGE UP (ref 0–0.2)
BASOPHILS # BLD AUTO: 0.04 K/UL — SIGNIFICANT CHANGE UP (ref 0–0.2)
BASOPHILS NFR BLD AUTO: 0 % — SIGNIFICANT CHANGE UP (ref 0–2)
BASOPHILS NFR BLD AUTO: 0.5 % — SIGNIFICANT CHANGE UP (ref 0–2)
BILIRUB SERPL-MCNC: 0.3 MG/DL — LOW (ref 0.4–2)
BILIRUB SERPL-MCNC: 0.4 MG/DL — SIGNIFICANT CHANGE UP (ref 0.4–2)
BILIRUB SERPL-MCNC: 0.6 MG/DL — SIGNIFICANT CHANGE UP (ref 0.4–2)
BILIRUB SERPL-MCNC: 1.2 MG/DL — SIGNIFICANT CHANGE UP (ref 0.4–2)
BILIRUB UR-MCNC: NEGATIVE — SIGNIFICANT CHANGE UP
BLD GP AB SCN SERPL QL: SIGNIFICANT CHANGE UP
BUN SERPL-MCNC: 23.7 MG/DL — HIGH (ref 8–20)
BUN SERPL-MCNC: 27.1 MG/DL — HIGH (ref 8–20)
BUN SERPL-MCNC: 35 MG/DL — HIGH (ref 8–20)
BUN SERPL-MCNC: 37.5 MG/DL — HIGH (ref 8–20)
BUN SERPL-MCNC: 40.1 MG/DL — HIGH (ref 8–20)
BUN SERPL-MCNC: 40.4 MG/DL — HIGH (ref 8–20)
BUN SERPL-MCNC: 40.8 MG/DL — HIGH (ref 8–20)
BUN SERPL-MCNC: 41.1 MG/DL — HIGH (ref 8–20)
BUN SERPL-MCNC: 42.2 MG/DL — HIGH (ref 8–20)
BUN SERPL-MCNC: 43.5 MG/DL — HIGH (ref 8–20)
CA-I SERPL-SCNC: 1.24 MMOL/L — SIGNIFICANT CHANGE UP (ref 1.15–1.33)
CALCIUM SERPL-MCNC: 10 MG/DL — SIGNIFICANT CHANGE UP (ref 8.4–10.5)
CALCIUM SERPL-MCNC: 8.3 MG/DL — LOW (ref 8.4–10.5)
CALCIUM SERPL-MCNC: 8.7 MG/DL — SIGNIFICANT CHANGE UP (ref 8.4–10.5)
CALCIUM SERPL-MCNC: 8.8 MG/DL — SIGNIFICANT CHANGE UP (ref 8.4–10.5)
CALCIUM SERPL-MCNC: 9.1 MG/DL — SIGNIFICANT CHANGE UP (ref 8.4–10.5)
CALCIUM SERPL-MCNC: 9.3 MG/DL — SIGNIFICANT CHANGE UP (ref 8.4–10.5)
CALCIUM SERPL-MCNC: 9.3 MG/DL — SIGNIFICANT CHANGE UP (ref 8.4–10.5)
CALCIUM SERPL-MCNC: 9.4 MG/DL — SIGNIFICANT CHANGE UP (ref 8.4–10.5)
CALCIUM SERPL-MCNC: 9.4 MG/DL — SIGNIFICANT CHANGE UP (ref 8.4–10.5)
CALCIUM SERPL-MCNC: 9.9 MG/DL — SIGNIFICANT CHANGE UP (ref 8.4–10.5)
CHLORIDE BLDV-SCNC: 97 MMOL/L — SIGNIFICANT CHANGE UP (ref 96–108)
CHLORIDE SERPL-SCNC: 91 MMOL/L — LOW (ref 96–108)
CHLORIDE SERPL-SCNC: 91 MMOL/L — LOW (ref 96–108)
CHLORIDE SERPL-SCNC: 93 MMOL/L — LOW (ref 96–108)
CHLORIDE SERPL-SCNC: 95 MMOL/L — LOW (ref 96–108)
CHLORIDE SERPL-SCNC: 95 MMOL/L — LOW (ref 96–108)
CHLORIDE SERPL-SCNC: 96 MMOL/L — SIGNIFICANT CHANGE UP (ref 96–108)
CHLORIDE SERPL-SCNC: 97 MMOL/L — SIGNIFICANT CHANGE UP (ref 96–108)
CHLORIDE SERPL-SCNC: 98 MMOL/L — SIGNIFICANT CHANGE UP (ref 96–108)
CO2 SERPL-SCNC: 16 MMOL/L — LOW (ref 22–29)
CO2 SERPL-SCNC: 20 MMOL/L — LOW (ref 22–29)
CO2 SERPL-SCNC: 20 MMOL/L — LOW (ref 22–29)
CO2 SERPL-SCNC: 22 MMOL/L — SIGNIFICANT CHANGE UP (ref 22–29)
CO2 SERPL-SCNC: 23 MMOL/L — SIGNIFICANT CHANGE UP (ref 22–29)
CO2 SERPL-SCNC: 23 MMOL/L — SIGNIFICANT CHANGE UP (ref 22–29)
CO2 SERPL-SCNC: 24 MMOL/L — SIGNIFICANT CHANGE UP (ref 22–29)
CO2 SERPL-SCNC: 24 MMOL/L — SIGNIFICANT CHANGE UP (ref 22–29)
CO2 SERPL-SCNC: 25 MMOL/L — SIGNIFICANT CHANGE UP (ref 22–29)
CO2 SERPL-SCNC: 26 MMOL/L — SIGNIFICANT CHANGE UP (ref 22–29)
COLOR FLD: ABNORMAL
COLOR SPEC: YELLOW — SIGNIFICANT CHANGE UP
CREAT ?TM UR-MCNC: 115 MG/DL — SIGNIFICANT CHANGE UP
CREAT SERPL-MCNC: 1.38 MG/DL — HIGH (ref 0.5–1.3)
CREAT SERPL-MCNC: 1.42 MG/DL — HIGH (ref 0.5–1.3)
CREAT SERPL-MCNC: 1.49 MG/DL — HIGH (ref 0.5–1.3)
CREAT SERPL-MCNC: 1.54 MG/DL — HIGH (ref 0.5–1.3)
CREAT SERPL-MCNC: 1.61 MG/DL — HIGH (ref 0.5–1.3)
CREAT SERPL-MCNC: 1.63 MG/DL — HIGH (ref 0.5–1.3)
CREAT SERPL-MCNC: 1.68 MG/DL — HIGH (ref 0.5–1.3)
CREAT SERPL-MCNC: 1.72 MG/DL — HIGH (ref 0.5–1.3)
CREAT SERPL-MCNC: 1.92 MG/DL — HIGH (ref 0.5–1.3)
CREAT SERPL-MCNC: 3.07 MG/DL — HIGH (ref 0.5–1.3)
CRP SERPL-MCNC: 9 MG/L — HIGH
CULTURE RESULTS: SIGNIFICANT CHANGE UP
CULTURE RESULTS: SIGNIFICANT CHANGE UP
DEPRECATED D DIMER PPP IA-ACNC: 741 NG/ML DDU
DEPRECATED S PNEUM 1 IGG SER-MCNC: 1.8 MCG/ML — SIGNIFICANT CHANGE UP
DEPRECATED S PNEUM12 IGG SER-MCNC: 4.8 MCG/ML — SIGNIFICANT CHANGE UP
DEPRECATED S PNEUM14 IGG SER-MCNC: 6.4 MCG/ML — SIGNIFICANT CHANGE UP
DEPRECATED S PNEUM17 IGG SER-MCNC: 71.2 MCG/ML — SIGNIFICANT CHANGE UP
DEPRECATED S PNEUM19 IGG SER-MCNC: 16.1 MCG/ML — SIGNIFICANT CHANGE UP
DEPRECATED S PNEUM19 IGG SER-MCNC: 18 MCG/ML — SIGNIFICANT CHANGE UP
DEPRECATED S PNEUM2 IGG SER-MCNC: 12.9 MCG/ML — SIGNIFICANT CHANGE UP
DEPRECATED S PNEUM20 IGG SER-MCNC: 25.7 MCG/ML — SIGNIFICANT CHANGE UP
DEPRECATED S PNEUM22 IGG SER-MCNC: 15.8 MCG/ML — SIGNIFICANT CHANGE UP
DEPRECATED S PNEUM23 IGG SER-MCNC: 11.6 MCG/ML — SIGNIFICANT CHANGE UP
DEPRECATED S PNEUM3 IGG SER-MCNC: 1.4 MCG/ML — SIGNIFICANT CHANGE UP
DEPRECATED S PNEUM4 IGG SER-MCNC: 5.9 MCG/ML — SIGNIFICANT CHANGE UP
DEPRECATED S PNEUM5 IGG SER-MCNC: 2.2 MCG/ML — SIGNIFICANT CHANGE UP
DEPRECATED S PNEUM8 IGG SER-MCNC: 14.9 MCG/ML — SIGNIFICANT CHANGE UP
DEPRECATED S PNEUM9 IGG SER-MCNC: 12.4 MCG/ML — SIGNIFICANT CHANGE UP
DEPRECATED S PNEUM9 IGG SER-MCNC: 5.2 MCG/ML — SIGNIFICANT CHANGE UP
DIFF PNL FLD: NEGATIVE — SIGNIFICANT CHANGE UP
EGFR: 20 ML/MIN/1.73M2 — LOW
EGFR: 36 ML/MIN/1.73M2 — LOW
EGFR: 41 ML/MIN/1.73M2 — LOW
EGFR: 42 ML/MIN/1.73M2 — LOW
EGFR: 43 ML/MIN/1.73M2 — LOW
EGFR: 44 ML/MIN/1.73M2 — LOW
EGFR: 46 ML/MIN/1.73M2 — LOW
EGFR: 48 ML/MIN/1.73M2 — LOW
EGFR: 51 ML/MIN/1.73M2 — LOW
EGFR: 53 ML/MIN/1.73M2 — LOW
EOSINOPHIL # BLD AUTO: 0 K/UL — SIGNIFICANT CHANGE UP (ref 0–0.5)
EOSINOPHIL # BLD AUTO: 0 K/UL — SIGNIFICANT CHANGE UP (ref 0–0.5)
EOSINOPHIL # BLD AUTO: 0.13 K/UL — SIGNIFICANT CHANGE UP (ref 0–0.5)
EOSINOPHIL # BLD AUTO: 0.21 K/UL — SIGNIFICANT CHANGE UP (ref 0–0.5)
EOSINOPHIL NFR BLD AUTO: 0 % — SIGNIFICANT CHANGE UP (ref 0–6)
EOSINOPHIL NFR BLD AUTO: 0 % — SIGNIFICANT CHANGE UP (ref 0–6)
EOSINOPHIL NFR BLD AUTO: 0.9 % — SIGNIFICANT CHANGE UP (ref 0–6)
EOSINOPHIL NFR BLD AUTO: 1.5 % — SIGNIFICANT CHANGE UP (ref 0–6)
ERYTHROCYTE [SEDIMENTATION RATE] IN BLOOD: 21 MM/HR — HIGH (ref 0–15)
FLUID INTAKE SUBSTANCE CLASS: SIGNIFICANT CHANGE UP
GAS PNL BLDA: SIGNIFICANT CHANGE UP
GAS PNL BLDV: 130 MMOL/L — LOW (ref 136–145)
GAS PNL BLDV: SIGNIFICANT CHANGE UP
GAS PNL BLDV: SIGNIFICANT CHANGE UP
GIANT PLATELETS BLD QL SMEAR: PRESENT — SIGNIFICANT CHANGE UP
GLUCOSE BLDV-MCNC: 199 MG/DL — HIGH (ref 70–99)
GLUCOSE SERPL-MCNC: 139 MG/DL — HIGH (ref 70–99)
GLUCOSE SERPL-MCNC: 146 MG/DL — HIGH (ref 70–99)
GLUCOSE SERPL-MCNC: 177 MG/DL — HIGH (ref 70–99)
GLUCOSE SERPL-MCNC: 186 MG/DL — HIGH (ref 70–99)
GLUCOSE SERPL-MCNC: 190 MG/DL — HIGH (ref 70–99)
GLUCOSE SERPL-MCNC: 197 MG/DL — HIGH (ref 70–99)
GLUCOSE SERPL-MCNC: 204 MG/DL — HIGH (ref 70–99)
GLUCOSE SERPL-MCNC: 210 MG/DL — HIGH (ref 70–99)
GLUCOSE SERPL-MCNC: 292 MG/DL — HIGH (ref 70–99)
GLUCOSE SERPL-MCNC: 440 MG/DL — HIGH (ref 70–99)
GLUCOSE UR QL: NEGATIVE MG/DL — SIGNIFICANT CHANGE UP
GRAM STN FLD: SIGNIFICANT CHANGE UP
HCO3 BLDV-SCNC: 25 MMOL/L — SIGNIFICANT CHANGE UP (ref 22–29)
HCT VFR BLD CALC: 36.4 % — LOW (ref 39–50)
HCT VFR BLD CALC: 39.1 % — SIGNIFICANT CHANGE UP (ref 39–50)
HCT VFR BLD CALC: 39.6 % — SIGNIFICANT CHANGE UP (ref 39–50)
HCT VFR BLD CALC: 40.3 % — SIGNIFICANT CHANGE UP (ref 39–50)
HCT VFR BLD CALC: 40.7 % — SIGNIFICANT CHANGE UP (ref 39–50)
HCT VFR BLD CALC: 41.3 % — SIGNIFICANT CHANGE UP (ref 39–50)
HCT VFR BLD CALC: 42.4 % — SIGNIFICANT CHANGE UP (ref 39–50)
HCT VFR BLD CALC: 43 % — SIGNIFICANT CHANGE UP (ref 39–50)
HCT VFR BLD CALC: 44.4 % — SIGNIFICANT CHANGE UP (ref 39–50)
HCT VFR BLD CALC: 46.8 % — SIGNIFICANT CHANGE UP (ref 39–50)
HCT VFR BLDA CALC: 48 % — SIGNIFICANT CHANGE UP
HCV AB S/CO SERPL IA: 0.09 S/CO — SIGNIFICANT CHANGE UP (ref 0–0.99)
HCV AB SERPL-IMP: SIGNIFICANT CHANGE UP
HGB BLD CALC-MCNC: 16.1 G/DL — SIGNIFICANT CHANGE UP (ref 12.6–17.4)
HGB BLD-MCNC: 12.9 G/DL — LOW (ref 13–17)
HGB BLD-MCNC: 13.7 G/DL — SIGNIFICANT CHANGE UP (ref 13–17)
HGB BLD-MCNC: 13.8 G/DL — SIGNIFICANT CHANGE UP (ref 13–17)
HGB BLD-MCNC: 14.2 G/DL — SIGNIFICANT CHANGE UP (ref 13–17)
HGB BLD-MCNC: 14.5 G/DL — SIGNIFICANT CHANGE UP (ref 13–17)
HGB BLD-MCNC: 14.5 G/DL — SIGNIFICANT CHANGE UP (ref 13–17)
HGB BLD-MCNC: 15.3 G/DL — SIGNIFICANT CHANGE UP (ref 13–17)
HGB BLD-MCNC: 15.5 G/DL — SIGNIFICANT CHANGE UP (ref 13–17)
HGB BLD-MCNC: 15.6 G/DL — SIGNIFICANT CHANGE UP (ref 13–17)
HGB BLD-MCNC: 16 G/DL — SIGNIFICANT CHANGE UP (ref 13–17)
IMM GRANULOCYTES NFR BLD AUTO: 0.5 % — SIGNIFICANT CHANGE UP (ref 0–0.9)
IMMUNOLOGIST REVIEW: SIGNIFICANT CHANGE UP
INR BLD: 1 RATIO — SIGNIFICANT CHANGE UP (ref 0.85–1.18)
INR BLD: 1.33 RATIO — HIGH (ref 0.85–1.18)
INR BLD: 1.56 RATIO — HIGH (ref 0.85–1.18)
KETONES UR-MCNC: NEGATIVE MG/DL — SIGNIFICANT CHANGE UP
LACTATE BLDV-MCNC: 1.8 MMOL/L — SIGNIFICANT CHANGE UP (ref 0.5–2)
LEGIONELLA AG UR QL: NEGATIVE — SIGNIFICANT CHANGE UP
LEGIONELLA AG UR QL: NEGATIVE — SIGNIFICANT CHANGE UP
LEUKOCYTE ESTERASE UR-ACNC: NEGATIVE — SIGNIFICANT CHANGE UP
LIDOCAIN IGE QN: 40 U/L — SIGNIFICANT CHANGE UP (ref 22–51)
LYMPHOCYTES # BLD AUTO: 0.38 K/UL — LOW (ref 1–3.3)
LYMPHOCYTES # BLD AUTO: 0.4 K/UL — LOW (ref 1–3.3)
LYMPHOCYTES # BLD AUTO: 0.8 K/UL — LOW (ref 1–3.3)
LYMPHOCYTES # BLD AUTO: 1.23 K/UL — SIGNIFICANT CHANGE UP (ref 1–3.3)
LYMPHOCYTES # BLD AUTO: 1.7 % — LOW (ref 13–44)
LYMPHOCYTES # BLD AUTO: 14.4 % — SIGNIFICANT CHANGE UP (ref 13–44)
LYMPHOCYTES # BLD AUTO: 4.3 % — LOW (ref 13–44)
LYMPHOCYTES # BLD AUTO: 8.8 % — LOW (ref 13–44)
LYMPHOCYTES # FLD: 5 % — SIGNIFICANT CHANGE UP
MAGNESIUM SERPL-MCNC: 1.8 MG/DL — SIGNIFICANT CHANGE UP (ref 1.6–2.6)
MAGNESIUM SERPL-MCNC: 1.9 MG/DL — SIGNIFICANT CHANGE UP (ref 1.8–2.6)
MANUAL SMEAR VERIFICATION: SIGNIFICANT CHANGE UP
MCHC RBC-ENTMCNC: 30.1 PG — SIGNIFICANT CHANGE UP (ref 27–34)
MCHC RBC-ENTMCNC: 30.3 PG — SIGNIFICANT CHANGE UP (ref 27–34)
MCHC RBC-ENTMCNC: 30.4 PG — SIGNIFICANT CHANGE UP (ref 27–34)
MCHC RBC-ENTMCNC: 30.5 PG — SIGNIFICANT CHANGE UP (ref 27–34)
MCHC RBC-ENTMCNC: 30.6 PG — SIGNIFICANT CHANGE UP (ref 27–34)
MCHC RBC-ENTMCNC: 30.7 PG — SIGNIFICANT CHANGE UP (ref 27–34)
MCHC RBC-ENTMCNC: 30.7 PG — SIGNIFICANT CHANGE UP (ref 27–34)
MCHC RBC-ENTMCNC: 30.8 PG — SIGNIFICANT CHANGE UP (ref 27–34)
MCHC RBC-ENTMCNC: 31.3 PG — SIGNIFICANT CHANGE UP (ref 27–34)
MCHC RBC-ENTMCNC: 31.4 PG — SIGNIFICANT CHANGE UP (ref 27–34)
MCHC RBC-ENTMCNC: 34.2 GM/DL — SIGNIFICANT CHANGE UP (ref 32–36)
MCHC RBC-ENTMCNC: 34.8 GM/DL — SIGNIFICANT CHANGE UP (ref 32–36)
MCHC RBC-ENTMCNC: 35 GM/DL — SIGNIFICANT CHANGE UP (ref 32–36)
MCHC RBC-ENTMCNC: 35.1 GM/DL — SIGNIFICANT CHANGE UP (ref 32–36)
MCHC RBC-ENTMCNC: 35.1 GM/DL — SIGNIFICANT CHANGE UP (ref 32–36)
MCHC RBC-ENTMCNC: 35.2 GM/DL — SIGNIFICANT CHANGE UP (ref 32–36)
MCHC RBC-ENTMCNC: 35.4 GM/DL — SIGNIFICANT CHANGE UP (ref 32–36)
MCHC RBC-ENTMCNC: 35.6 GM/DL — SIGNIFICANT CHANGE UP (ref 32–36)
MCHC RBC-ENTMCNC: 35.6 GM/DL — SIGNIFICANT CHANGE UP (ref 32–36)
MCHC RBC-ENTMCNC: 36.6 GM/DL — HIGH (ref 32–36)
MCV RBC AUTO: 84.5 FL — SIGNIFICANT CHANGE UP (ref 80–100)
MCV RBC AUTO: 85.8 FL — SIGNIFICANT CHANGE UP (ref 80–100)
MCV RBC AUTO: 86.2 FL — SIGNIFICANT CHANGE UP (ref 80–100)
MCV RBC AUTO: 86.3 FL — SIGNIFICANT CHANGE UP (ref 80–100)
MCV RBC AUTO: 86.4 FL — SIGNIFICANT CHANGE UP (ref 80–100)
MCV RBC AUTO: 86.9 FL — SIGNIFICANT CHANGE UP (ref 80–100)
MCV RBC AUTO: 87 FL — SIGNIFICANT CHANGE UP (ref 80–100)
MCV RBC AUTO: 87.4 FL — SIGNIFICANT CHANGE UP (ref 80–100)
MCV RBC AUTO: 87.6 FL — SIGNIFICANT CHANGE UP (ref 80–100)
MCV RBC AUTO: 91.4 FL — SIGNIFICANT CHANGE UP (ref 80–100)
METAMYELOCYTES # FLD: 0.9 % — HIGH (ref 0–0)
MONOCYTES # BLD AUTO: 0.15 K/UL — SIGNIFICANT CHANGE UP (ref 0–0.9)
MONOCYTES # BLD AUTO: 0.73 K/UL — SIGNIFICANT CHANGE UP (ref 0–0.9)
MONOCYTES # BLD AUTO: 0.78 K/UL — SIGNIFICANT CHANGE UP (ref 0–0.9)
MONOCYTES # BLD AUTO: 1.05 K/UL — HIGH (ref 0–0.9)
MONOCYTES NFR BLD AUTO: 1.7 % — LOW (ref 2–14)
MONOCYTES NFR BLD AUTO: 4.4 % — SIGNIFICANT CHANGE UP (ref 2–14)
MONOCYTES NFR BLD AUTO: 8 % — SIGNIFICANT CHANGE UP (ref 2–14)
MONOCYTES NFR BLD AUTO: 9.1 % — SIGNIFICANT CHANGE UP (ref 2–14)
MONOS+MACROS # FLD: 8 % — SIGNIFICANT CHANGE UP
NEUTROPHILS # BLD AUTO: 22.09 K/UL — HIGH (ref 1.8–7.4)
NEUTROPHILS # BLD AUTO: 6.31 K/UL — SIGNIFICANT CHANGE UP (ref 1.8–7.4)
NEUTROPHILS # BLD AUTO: 7.4 K/UL — SIGNIFICANT CHANGE UP (ref 1.8–7.4)
NEUTROPHILS # BLD AUTO: 8.25 K/UL — HIGH (ref 1.8–7.4)
NEUTROPHILS NFR BLD AUTO: 74 % — SIGNIFICANT CHANGE UP (ref 43–77)
NEUTROPHILS NFR BLD AUTO: 81.4 % — HIGH (ref 43–77)
NEUTROPHILS NFR BLD AUTO: 92.2 % — HIGH (ref 43–77)
NEUTROPHILS NFR BLD AUTO: 93 % — HIGH (ref 43–77)
NEUTROPHILS-BODY FLUID: 72 % — SIGNIFICANT CHANGE UP
NIGHT BLUE STAIN TISS: SIGNIFICANT CHANGE UP
NITRITE UR-MCNC: NEGATIVE — SIGNIFICANT CHANGE UP
NON-GYNECOLOGICAL CYTOLOGY STUDY: SIGNIFICANT CHANGE UP
NT-PROBNP SERPL-SCNC: 149 PG/ML — SIGNIFICANT CHANGE UP (ref 0–300)
OTHER CELLS FLD MANUAL: 15 % — SIGNIFICANT CHANGE UP
OVALOCYTES BLD QL SMEAR: SLIGHT — SIGNIFICANT CHANGE UP
P JIROVECII DNA L RESP QL NAA+NON-PROBE: NEGATIVE — SIGNIFICANT CHANGE UP
PCO2 BLDV: 44 MMHG — SIGNIFICANT CHANGE UP (ref 42–55)
PH BLDV: 7.37 — SIGNIFICANT CHANGE UP (ref 7.32–7.43)
PH UR: 6.5 — SIGNIFICANT CHANGE UP (ref 5–8)
PHOSPHATE SERPL-MCNC: 3.5 MG/DL — SIGNIFICANT CHANGE UP (ref 2.4–4.7)
PHOSPHATE SERPL-MCNC: 7.3 MG/DL — HIGH (ref 2.4–4.7)
PLAT MORPH BLD: NORMAL — SIGNIFICANT CHANGE UP
PLATELET # BLD AUTO: 100 K/UL — LOW (ref 150–400)
PLATELET # BLD AUTO: 106 K/UL — LOW (ref 150–400)
PLATELET # BLD AUTO: 108 K/UL — LOW (ref 150–400)
PLATELET # BLD AUTO: 109 K/UL — LOW (ref 150–400)
PLATELET # BLD AUTO: 110 K/UL — LOW (ref 150–400)
PLATELET # BLD AUTO: 121 K/UL — LOW (ref 150–400)
PLATELET # BLD AUTO: 131 K/UL — LOW (ref 150–400)
PLATELET # BLD AUTO: 152 K/UL — SIGNIFICANT CHANGE UP (ref 150–400)
PLATELET # BLD AUTO: 85 K/UL — LOW (ref 150–400)
PLATELET # BLD AUTO: 89 K/UL — LOW (ref 150–400)
PO2 BLDV: 58 MMHG — HIGH (ref 25–45)
POLYCHROMASIA BLD QL SMEAR: SLIGHT — SIGNIFICANT CHANGE UP
POTASSIUM BLDV-SCNC: 4.7 MMOL/L — SIGNIFICANT CHANGE UP (ref 3.5–5.1)
POTASSIUM SERPL-MCNC: 4.1 MMOL/L — SIGNIFICANT CHANGE UP (ref 3.5–5.3)
POTASSIUM SERPL-MCNC: 4.3 MMOL/L — SIGNIFICANT CHANGE UP (ref 3.5–5.3)
POTASSIUM SERPL-MCNC: 4.6 MMOL/L — SIGNIFICANT CHANGE UP (ref 3.5–5.3)
POTASSIUM SERPL-MCNC: 4.8 MMOL/L — SIGNIFICANT CHANGE UP (ref 3.5–5.3)
POTASSIUM SERPL-MCNC: 4.9 MMOL/L — SIGNIFICANT CHANGE UP (ref 3.5–5.3)
POTASSIUM SERPL-MCNC: 5.4 MMOL/L — HIGH (ref 3.5–5.3)
POTASSIUM SERPL-MCNC: 5.6 MMOL/L — HIGH (ref 3.5–5.3)
POTASSIUM SERPL-MCNC: 5.8 MMOL/L — HIGH (ref 3.5–5.3)
POTASSIUM SERPL-SCNC: 4.1 MMOL/L — SIGNIFICANT CHANGE UP (ref 3.5–5.3)
POTASSIUM SERPL-SCNC: 4.3 MMOL/L — SIGNIFICANT CHANGE UP (ref 3.5–5.3)
POTASSIUM SERPL-SCNC: 4.6 MMOL/L — SIGNIFICANT CHANGE UP (ref 3.5–5.3)
POTASSIUM SERPL-SCNC: 4.8 MMOL/L — SIGNIFICANT CHANGE UP (ref 3.5–5.3)
POTASSIUM SERPL-SCNC: 4.9 MMOL/L — SIGNIFICANT CHANGE UP (ref 3.5–5.3)
POTASSIUM SERPL-SCNC: 5.4 MMOL/L — HIGH (ref 3.5–5.3)
POTASSIUM SERPL-SCNC: 5.6 MMOL/L — HIGH (ref 3.5–5.3)
POTASSIUM SERPL-SCNC: 5.8 MMOL/L — HIGH (ref 3.5–5.3)
POTASSIUM UR-SCNC: 62 MMOL/L — SIGNIFICANT CHANGE UP
PROCALCITONIN SERPL-MCNC: 0.15 NG/ML — HIGH (ref 0.02–0.1)
PROT SERPL-MCNC: 5.7 G/DL — LOW (ref 6.6–8.7)
PROT SERPL-MCNC: 6.5 G/DL — LOW (ref 6.6–8.7)
PROT SERPL-MCNC: 7.1 G/DL — SIGNIFICANT CHANGE UP (ref 6.6–8.7)
PROT SERPL-MCNC: 7.2 G/DL — SIGNIFICANT CHANGE UP (ref 6.6–8.7)
PROT UR-MCNC: NEGATIVE MG/DL — SIGNIFICANT CHANGE UP
PROTHROM AB SERPL-ACNC: 11.1 SEC — SIGNIFICANT CHANGE UP (ref 9.5–13)
PROTHROM AB SERPL-ACNC: 14.6 SEC — HIGH (ref 9.5–13)
PROTHROM AB SERPL-ACNC: 17.1 SEC — HIGH (ref 9.5–13)
RAPID RVP RESULT: NOT DETECTED
RAPID RVP RESULT: SIGNIFICANT CHANGE UP
RBC # BLD: 4.22 M/UL — SIGNIFICANT CHANGE UP (ref 4.2–5.8)
RBC # BLD: 4.5 M/UL — SIGNIFICANT CHANGE UP (ref 4.2–5.8)
RBC # BLD: 4.53 M/UL — SIGNIFICANT CHANGE UP (ref 4.2–5.8)
RBC # BLD: 4.63 M/UL — SIGNIFICANT CHANGE UP (ref 4.2–5.8)
RBC # BLD: 4.72 M/UL — SIGNIFICANT CHANGE UP (ref 4.2–5.8)
RBC # BLD: 4.78 M/UL — SIGNIFICANT CHANGE UP (ref 4.2–5.8)
RBC # BLD: 4.94 M/UL — SIGNIFICANT CHANGE UP (ref 4.2–5.8)
RBC # BLD: 5.07 M/UL — SIGNIFICANT CHANGE UP (ref 4.2–5.8)
RBC # BLD: 5.09 M/UL — SIGNIFICANT CHANGE UP (ref 4.2–5.8)
RBC # BLD: 5.12 M/UL — SIGNIFICANT CHANGE UP (ref 4.2–5.8)
RBC # FLD: 13.2 % — SIGNIFICANT CHANGE UP (ref 10.3–14.5)
RBC # FLD: 13.5 % — SIGNIFICANT CHANGE UP (ref 10.3–14.5)
RBC # FLD: 13.6 % — SIGNIFICANT CHANGE UP (ref 10.3–14.5)
RBC # FLD: 13.7 % — SIGNIFICANT CHANGE UP (ref 10.3–14.5)
RBC # FLD: 13.8 % — SIGNIFICANT CHANGE UP (ref 10.3–14.5)
RBC # FLD: 14 % — SIGNIFICANT CHANGE UP (ref 10.3–14.5)
RBC BLD AUTO: ABNORMAL
RBC BLD AUTO: NORMAL — SIGNIFICANT CHANGE UP
RBC BLD AUTO: NORMAL — SIGNIFICANT CHANGE UP
RCV VOL RI: HIGH /UL (ref 0–0)
S PNEUM SEROTYPE IGG SER-IMP: 18.7 MCG/ML — SIGNIFICANT CHANGE UP
S PNEUM SEROTYPE IGG SER-IMP: 2.6 MCG/ML — SIGNIFICANT CHANGE UP
S PNEUM SEROTYPE IGG SER-IMP: 40.2 MCG/ML — SIGNIFICANT CHANGE UP
S PNEUM SEROTYPE IGG SER-IMP: 47.2 MCG/ML — SIGNIFICANT CHANGE UP
S PNEUM SEROTYPE IGG SER-IMP: 6.2 MCG/ML — SIGNIFICANT CHANGE UP
S PNEUM SEROTYPE IGG SER-IMP: 6.7 MCG/ML — SIGNIFICANT CHANGE UP
S PNEUM SEROTYPE IGG SER-IMP: 7.7 MCG/ML — SIGNIFICANT CHANGE UP
SAO2 % BLDV: 85.2 % — SIGNIFICANT CHANGE UP
SARS-COV-2 RNA PNL RESP NAA+PROBE: NOT DETECTED
SARS-COV-2 RNA SPEC QL NAA+PROBE: SIGNIFICANT CHANGE UP
SODIUM SERPL-SCNC: 130 MMOL/L — LOW (ref 135–145)
SODIUM SERPL-SCNC: 131 MMOL/L — LOW (ref 135–145)
SODIUM SERPL-SCNC: 131 MMOL/L — LOW (ref 135–145)
SODIUM SERPL-SCNC: 132 MMOL/L — LOW (ref 135–145)
SODIUM SERPL-SCNC: 133 MMOL/L — LOW (ref 135–145)
SODIUM SERPL-SCNC: 134 MMOL/L — LOW (ref 135–145)
SODIUM SERPL-SCNC: 134 MMOL/L — LOW (ref 135–145)
SODIUM UR-SCNC: <30 MMOL/L — SIGNIFICANT CHANGE UP
SP GR SPEC: 1.01 — SIGNIFICANT CHANGE UP (ref 1–1.03)
SPECIMEN SOURCE FLD: SIGNIFICANT CHANGE UP
SPECIMEN SOURCE: SIGNIFICANT CHANGE UP
STREPTOCOCCUS PNEUMONIAE IGG SEROTYPE INTERPRETATION: SIGNIFICANT CHANGE UP
TOTAL NUCLEATED CELL COUNT, BODY FLUID: 820 /UL — SIGNIFICANT CHANGE UP
TUBE TYPE: SIGNIFICANT CHANGE UP
UROBILINOGEN FLD QL: 1 MG/DL — SIGNIFICANT CHANGE UP (ref 0.2–1)
VARIANT LYMPHS # BLD: 0.9 % — SIGNIFICANT CHANGE UP (ref 0–6)
VARIANT LYMPHS # BLD: 1.8 % — SIGNIFICANT CHANGE UP (ref 0–6)
WBC # BLD: 10.81 K/UL — HIGH (ref 3.8–10.5)
WBC # BLD: 10.97 K/UL — HIGH (ref 3.8–10.5)
WBC # BLD: 11.36 K/UL — HIGH (ref 3.8–10.5)
WBC # BLD: 22.55 K/UL — HIGH (ref 3.8–10.5)
WBC # BLD: 23.75 K/UL — HIGH (ref 3.8–10.5)
WBC # BLD: 8.01 K/UL — SIGNIFICANT CHANGE UP (ref 3.8–10.5)
WBC # BLD: 8.53 K/UL — SIGNIFICANT CHANGE UP (ref 3.8–10.5)
WBC # BLD: 8.95 K/UL — SIGNIFICANT CHANGE UP (ref 3.8–10.5)
WBC # BLD: 9.04 K/UL — SIGNIFICANT CHANGE UP (ref 3.8–10.5)
WBC # BLD: 9.09 K/UL — SIGNIFICANT CHANGE UP (ref 3.8–10.5)
WBC # FLD AUTO: 10.81 K/UL — HIGH (ref 3.8–10.5)
WBC # FLD AUTO: 10.97 K/UL — HIGH (ref 3.8–10.5)
WBC # FLD AUTO: 11.36 K/UL — HIGH (ref 3.8–10.5)
WBC # FLD AUTO: 22.55 K/UL — HIGH (ref 3.8–10.5)
WBC # FLD AUTO: 23.75 K/UL — HIGH (ref 3.8–10.5)
WBC # FLD AUTO: 8.01 K/UL — SIGNIFICANT CHANGE UP (ref 3.8–10.5)
WBC # FLD AUTO: 8.53 K/UL — SIGNIFICANT CHANGE UP (ref 3.8–10.5)
WBC # FLD AUTO: 8.95 K/UL — SIGNIFICANT CHANGE UP (ref 3.8–10.5)
WBC # FLD AUTO: 9.04 K/UL — SIGNIFICANT CHANGE UP (ref 3.8–10.5)
WBC # FLD AUTO: 9.09 K/UL — SIGNIFICANT CHANGE UP (ref 3.8–10.5)

## 2024-01-01 PROCEDURE — 82435 ASSAY OF BLOOD CHLORIDE: CPT

## 2024-01-01 PROCEDURE — 86317 IMMUNOASSAY INFECTIOUS AGENT: CPT

## 2024-01-01 PROCEDURE — 86850 RBC ANTIBODY SCREEN: CPT

## 2024-01-01 PROCEDURE — 71045 X-RAY EXAM CHEST 1 VIEW: CPT | Mod: 26,77,76

## 2024-01-01 PROCEDURE — 71045 X-RAY EXAM CHEST 1 VIEW: CPT

## 2024-01-01 PROCEDURE — 82330 ASSAY OF CALCIUM: CPT

## 2024-01-01 PROCEDURE — 99232 SBSQ HOSP IP/OBS MODERATE 35: CPT

## 2024-01-01 PROCEDURE — 87102 FUNGUS ISOLATION CULTURE: CPT

## 2024-01-01 PROCEDURE — 99214 OFFICE O/P EST MOD 30 MIN: CPT

## 2024-01-01 PROCEDURE — 83880 ASSAY OF NATRIURETIC PEPTIDE: CPT

## 2024-01-01 PROCEDURE — 89051 BODY FLUID CELL COUNT: CPT

## 2024-01-01 PROCEDURE — 93306 TTE W/DOPPLER COMPLETE: CPT

## 2024-01-01 PROCEDURE — 31623 DX BRONCHOSCOPE/BRUSH: CPT

## 2024-01-01 PROCEDURE — 85652 RBC SED RATE AUTOMATED: CPT

## 2024-01-01 PROCEDURE — 0225U NFCT DS DNA&RNA 21 SARSCOV2: CPT

## 2024-01-01 PROCEDURE — 93970 EXTREMITY STUDY: CPT | Mod: 26

## 2024-01-01 PROCEDURE — 99223 1ST HOSP IP/OBS HIGH 75: CPT

## 2024-01-01 PROCEDURE — 85027 COMPLETE CBC AUTOMATED: CPT

## 2024-01-01 PROCEDURE — 93010 ELECTROCARDIOGRAM REPORT: CPT

## 2024-01-01 PROCEDURE — 99233 SBSQ HOSP IP/OBS HIGH 50: CPT

## 2024-01-01 PROCEDURE — 99223 1ST HOSP IP/OBS HIGH 75: CPT | Mod: GC

## 2024-01-01 PROCEDURE — 85014 HEMATOCRIT: CPT

## 2024-01-01 PROCEDURE — 80048 BASIC METABOLIC PNL TOTAL CA: CPT

## 2024-01-01 PROCEDURE — 85018 HEMOGLOBIN: CPT

## 2024-01-01 PROCEDURE — 94760 N-INVAS EAR/PLS OXIMETRY 1: CPT

## 2024-01-01 PROCEDURE — 83605 ASSAY OF LACTIC ACID: CPT

## 2024-01-01 PROCEDURE — 82803 BLOOD GASES ANY COMBINATION: CPT

## 2024-01-01 PROCEDURE — 96375 TX/PRO/DX INJ NEW DRUG ADDON: CPT

## 2024-01-01 PROCEDURE — 94640 AIRWAY INHALATION TREATMENT: CPT

## 2024-01-01 PROCEDURE — 99239 HOSP IP/OBS DSCHRG MGMT >30: CPT

## 2024-01-01 PROCEDURE — 71046 X-RAY EXAM CHEST 2 VIEWS: CPT

## 2024-01-01 PROCEDURE — 85025 COMPLETE CBC W/AUTO DIFF WBC: CPT

## 2024-01-01 PROCEDURE — 93005 ELECTROCARDIOGRAM TRACING: CPT

## 2024-01-01 PROCEDURE — 99291 CRITICAL CARE FIRST HOUR: CPT

## 2024-01-01 PROCEDURE — 87449 NOS EACH ORGANISM AG IA: CPT

## 2024-01-01 PROCEDURE — 36600 WITHDRAWAL OF ARTERIAL BLOOD: CPT

## 2024-01-01 PROCEDURE — 88112 CYTOPATH CELL ENHANCE TECH: CPT

## 2024-01-01 PROCEDURE — 99291 CRITICAL CARE FIRST HOUR: CPT | Mod: 25

## 2024-01-01 PROCEDURE — 84132 ASSAY OF SERUM POTASSIUM: CPT

## 2024-01-01 PROCEDURE — 99285 EMERGENCY DEPT VISIT HI MDM: CPT | Mod: FS

## 2024-01-01 PROCEDURE — 84295 ASSAY OF SERUM SODIUM: CPT

## 2024-01-01 PROCEDURE — 71275 CT ANGIOGRAPHY CHEST: CPT | Mod: 26,MH

## 2024-01-01 PROCEDURE — 82570 ASSAY OF URINE CREATININE: CPT

## 2024-01-01 PROCEDURE — 96374 THER/PROPH/DIAG INJ IV PUSH: CPT

## 2024-01-01 PROCEDURE — 31624 DX BRONCHOSCOPE/LAVAGE: CPT

## 2024-01-01 PROCEDURE — 87206 SMEAR FLUORESCENT/ACID STAI: CPT

## 2024-01-01 PROCEDURE — 88305 TISSUE EXAM BY PATHOLOGIST: CPT | Mod: 26

## 2024-01-01 PROCEDURE — 94002 VENT MGMT INPAT INIT DAY: CPT

## 2024-01-01 PROCEDURE — 84133 ASSAY OF URINE POTASSIUM: CPT

## 2024-01-01 PROCEDURE — 78815 PET IMAGE W/CT SKULL-THIGH: CPT | Mod: 26,PI

## 2024-01-01 PROCEDURE — 94660 CPAP INITIATION&MGMT: CPT

## 2024-01-01 PROCEDURE — 71046 X-RAY EXAM CHEST 2 VIEWS: CPT | Mod: 26

## 2024-01-01 PROCEDURE — 88305 TISSUE EXAM BY PATHOLOGIST: CPT

## 2024-01-01 PROCEDURE — 86581 STRPTCS PNEUM ANTB SEROT IA: CPT

## 2024-01-01 PROCEDURE — 86900 BLOOD TYPING SEROLOGIC ABO: CPT

## 2024-01-01 PROCEDURE — 83735 ASSAY OF MAGNESIUM: CPT

## 2024-01-01 PROCEDURE — 85730 THROMBOPLASTIN TIME PARTIAL: CPT

## 2024-01-01 PROCEDURE — 87086 URINE CULTURE/COLONY COUNT: CPT

## 2024-01-01 PROCEDURE — 82947 ASSAY GLUCOSE BLOOD QUANT: CPT

## 2024-01-01 PROCEDURE — 71045 X-RAY EXAM CHEST 1 VIEW: CPT | Mod: 26,77

## 2024-01-01 PROCEDURE — C9399: CPT

## 2024-01-01 PROCEDURE — 84145 PROCALCITONIN (PCT): CPT

## 2024-01-01 PROCEDURE — 93970 EXTREMITY STUDY: CPT

## 2024-01-01 PROCEDURE — 88112 CYTOPATH CELL ENHANCE TECH: CPT | Mod: 26

## 2024-01-01 PROCEDURE — 71250 CT THORAX DX C-: CPT | Mod: MC

## 2024-01-01 PROCEDURE — 86803 HEPATITIS C AB TEST: CPT

## 2024-01-01 PROCEDURE — 99222 1ST HOSP IP/OBS MODERATE 55: CPT

## 2024-01-01 PROCEDURE — 71275 CT ANGIOGRAPHY CHEST: CPT

## 2024-01-01 PROCEDURE — 36415 COLL VENOUS BLD VENIPUNCTURE: CPT

## 2024-01-01 PROCEDURE — 99291 CRITICAL CARE FIRST HOUR: CPT | Mod: FT,25

## 2024-01-01 PROCEDURE — 83690 ASSAY OF LIPASE: CPT

## 2024-01-01 PROCEDURE — 99497 ADVNCD CARE PLAN 30 MIN: CPT

## 2024-01-01 PROCEDURE — 81003 URINALYSIS AUTO W/O SCOPE: CPT

## 2024-01-01 PROCEDURE — 85610 PROTHROMBIN TIME: CPT

## 2024-01-01 PROCEDURE — 86901 BLOOD TYPING SEROLOGIC RH(D): CPT

## 2024-01-01 PROCEDURE — 87116 MYCOBACTERIA CULTURE: CPT

## 2024-01-01 PROCEDURE — 86140 C-REACTIVE PROTEIN: CPT

## 2024-01-01 PROCEDURE — 99285 EMERGENCY DEPT VISIT HI MDM: CPT

## 2024-01-01 PROCEDURE — 80053 COMPREHEN METABOLIC PANEL: CPT

## 2024-01-01 PROCEDURE — 84100 ASSAY OF PHOSPHORUS: CPT

## 2024-01-01 PROCEDURE — 87040 BLOOD CULTURE FOR BACTERIA: CPT

## 2024-01-01 PROCEDURE — 71250 CT THORAX DX C-: CPT | Mod: 26

## 2024-01-01 PROCEDURE — 99231 SBSQ HOSP IP/OBS SF/LOW 25: CPT

## 2024-01-01 PROCEDURE — 99442: CPT | Mod: 93

## 2024-01-01 PROCEDURE — 93306 TTE W/DOPPLER COMPLETE: CPT | Mod: 26

## 2024-01-01 PROCEDURE — 31500 INSERT EMERGENCY AIRWAY: CPT

## 2024-01-01 PROCEDURE — 84300 ASSAY OF URINE SODIUM: CPT

## 2024-01-01 PROCEDURE — 87594 PNEUMCYSTS JIROVECII AMP PRB: CPT

## 2024-01-01 PROCEDURE — 71045 X-RAY EXAM CHEST 1 VIEW: CPT | Mod: 26

## 2024-01-01 PROCEDURE — 87070 CULTURE OTHR SPECIMN AEROBIC: CPT

## 2024-01-01 PROCEDURE — 36620 INSERTION CATHETER ARTERY: CPT

## 2024-01-01 PROCEDURE — 87015 SPECIMEN INFECT AGNT CONCNTJ: CPT

## 2024-01-01 RX ORDER — METOPROLOL TARTRATE 50 MG
1 TABLET ORAL
Refills: 0 | DISCHARGE

## 2024-01-01 RX ORDER — ALBUTEROL SULFATE 2.5 MG/3ML
(2.5 MG/3ML) SOLUTION RESPIRATORY (INHALATION)
Qty: 1 | Refills: 3 | Status: ACTIVE | COMMUNITY
Start: 2024-01-01 | End: 1900-01-01

## 2024-01-01 RX ORDER — PHENYLEPHRINE HYDROCHLORIDE 10 MG/ML
7 INJECTION INTRAVENOUS
Qty: 160 | Refills: 0 | Status: DISCONTINUED | OUTPATIENT
Start: 2024-01-01 | End: 2024-01-01

## 2024-01-01 RX ORDER — FUROSEMIDE 40 MG
40 TABLET ORAL ONCE
Refills: 0 | Status: COMPLETED | OUTPATIENT
Start: 2024-01-01 | End: 2024-01-01

## 2024-01-01 RX ORDER — AZITHROMYCIN 250 MG/1
250 TABLET, FILM COATED ORAL
Qty: 1 | Refills: 0 | Status: COMPLETED | COMMUNITY
Start: 2024-01-01 | End: 2024-01-01

## 2024-01-01 RX ORDER — PIPERACILLIN AND TAZOBACTAM 4; .5 G/20ML; G/20ML
3.38 INJECTION, POWDER, LYOPHILIZED, FOR SOLUTION INTRAVENOUS EVERY 8 HOURS
Refills: 0 | Status: DISCONTINUED | OUTPATIENT
Start: 2024-01-01 | End: 2024-01-01

## 2024-01-01 RX ORDER — LOSARTAN POTASSIUM 100 MG/1
100 TABLET, FILM COATED ORAL DAILY
Refills: 0 | Status: DISCONTINUED | OUTPATIENT
Start: 2024-01-01 | End: 2024-01-01

## 2024-01-01 RX ORDER — PIPERACILLIN AND TAZOBACTAM 4; .5 G/20ML; G/20ML
3.38 INJECTION, POWDER, LYOPHILIZED, FOR SOLUTION INTRAVENOUS ONCE
Refills: 0 | Status: COMPLETED | OUTPATIENT
Start: 2024-01-01 | End: 2024-01-01

## 2024-01-01 RX ORDER — BUDESONIDE AND FORMOTEROL FUMARATE DIHYDRATE 160; 4.5 UG/1; UG/1
2 AEROSOL RESPIRATORY (INHALATION)
Refills: 0 | Status: DISCONTINUED | OUTPATIENT
Start: 2024-01-01 | End: 2024-01-01

## 2024-01-01 RX ORDER — IPRATROPIUM/ALBUTEROL SULFATE 18-103MCG
3 AEROSOL WITH ADAPTER (GRAM) INHALATION ONCE
Refills: 0 | Status: COMPLETED | OUTPATIENT
Start: 2024-01-01 | End: 2024-01-01

## 2024-01-01 RX ORDER — ENOXAPARIN SODIUM 100 MG/ML
40 INJECTION SUBCUTANEOUS EVERY 24 HOURS
Refills: 0 | Status: DISCONTINUED | OUTPATIENT
Start: 2024-01-01 | End: 2024-01-01

## 2024-01-01 RX ORDER — SODIUM CHLORIDE 9 MG/ML
4 INJECTION INTRAMUSCULAR; INTRAVENOUS; SUBCUTANEOUS ONCE
Refills: 0 | Status: COMPLETED | OUTPATIENT
Start: 2024-01-01 | End: 2024-01-01

## 2024-01-01 RX ORDER — ONDANSETRON 8 MG/1
4 TABLET, FILM COATED ORAL EVERY 6 HOURS
Refills: 0 | Status: DISCONTINUED | OUTPATIENT
Start: 2024-01-01 | End: 2024-01-01

## 2024-01-01 RX ORDER — FLUTICASONE FUROATE, UMECLIDINIUM BROMIDE AND VILANTEROL TRIFENATATE 200; 62.5; 25 UG/1; UG/1; UG/1
1 POWDER RESPIRATORY (INHALATION)
Qty: 0 | Refills: 0 | DISCHARGE

## 2024-01-01 RX ORDER — HEPARIN SODIUM 5000 [USP'U]/ML
INJECTION INTRAVENOUS; SUBCUTANEOUS
Qty: 25000 | Refills: 0 | Status: DISCONTINUED | OUTPATIENT
Start: 2024-01-01 | End: 2024-01-01

## 2024-01-01 RX ORDER — HEPARIN SODIUM 5000 [USP'U]/ML
3500 INJECTION INTRAVENOUS; SUBCUTANEOUS EVERY 6 HOURS
Refills: 0 | Status: DISCONTINUED | OUTPATIENT
Start: 2024-01-01 | End: 2024-01-01

## 2024-01-01 RX ORDER — HYDROMORPHONE HYDROCHLORIDE 2 MG/ML
2 INJECTION INTRAMUSCULAR; INTRAVENOUS; SUBCUTANEOUS
Qty: 100 | Refills: 0 | Status: DISCONTINUED | OUTPATIENT
Start: 2024-01-01 | End: 2024-01-01

## 2024-01-01 RX ORDER — AZITHROMYCIN 500 MG/1
500 TABLET, FILM COATED ORAL EVERY 24 HOURS
Refills: 0 | Status: DISCONTINUED | OUTPATIENT
Start: 2024-01-01 | End: 2024-01-01

## 2024-01-01 RX ORDER — ACETYLCYSTEINE 200 MG/ML
4 VIAL (ML) MISCELLANEOUS EVERY 6 HOURS
Refills: 0 | Status: DISCONTINUED | OUTPATIENT
Start: 2024-01-01 | End: 2024-01-01

## 2024-01-01 RX ORDER — ATORVASTATIN CALCIUM 80 MG/1
20 TABLET, FILM COATED ORAL AT BEDTIME
Refills: 0 | Status: DISCONTINUED | OUTPATIENT
Start: 2024-01-01 | End: 2024-01-01

## 2024-01-01 RX ORDER — ALBUTEROL 90 UG/1
2.5 AEROSOL, METERED ORAL EVERY 6 HOURS
Refills: 0 | Status: DISCONTINUED | OUTPATIENT
Start: 2024-01-01 | End: 2024-01-01

## 2024-01-01 RX ORDER — HEPARIN SODIUM 5000 [USP'U]/ML
5000 INJECTION INTRAVENOUS; SUBCUTANEOUS EVERY 12 HOURS
Refills: 0 | Status: COMPLETED | OUTPATIENT
Start: 2024-01-01 | End: 2024-01-01

## 2024-01-01 RX ORDER — CISATRACURIUM BESYLATE 2 MG/ML
3 INJECTION INTRAVENOUS
Qty: 200 | Refills: 0 | Status: DISCONTINUED | OUTPATIENT
Start: 2024-01-01 | End: 2024-01-01

## 2024-01-01 RX ORDER — PREDNISONE 20 MG/1
20 TABLET ORAL DAILY
Qty: 21 | Refills: 1 | Status: ACTIVE | COMMUNITY
Start: 2024-01-01 | End: 1900-01-01

## 2024-01-01 RX ORDER — HEPARIN SODIUM 5000 [USP'U]/ML
7500 INJECTION INTRAVENOUS; SUBCUTANEOUS EVERY 6 HOURS
Refills: 0 | Status: DISCONTINUED | OUTPATIENT
Start: 2024-01-01 | End: 2024-01-01

## 2024-01-01 RX ORDER — GUAIFENESIN 600 MG/1
600 TABLET, EXTENDED RELEASE ORAL
Refills: 0 | Status: ACTIVE | COMMUNITY

## 2024-01-01 RX ORDER — IPRATROPIUM/ALBUTEROL SULFATE 18-103MCG
3 AEROSOL WITH ADAPTER (GRAM) INHALATION EVERY 6 HOURS
Refills: 0 | Status: DISCONTINUED | OUTPATIENT
Start: 2024-01-01 | End: 2024-01-01

## 2024-01-01 RX ORDER — PHENYLEPHRINE HYDROCHLORIDE 10 MG/ML
0.1 INJECTION INTRAVENOUS
Qty: 40 | Refills: 0 | Status: DISCONTINUED | OUTPATIENT
Start: 2024-01-01 | End: 2024-01-01

## 2024-01-01 RX ORDER — BENZOCAINE AND MENTHOL 5; 1 G/100ML; G/100ML
1 LIQUID ORAL ONCE
Refills: 0 | Status: COMPLETED | OUTPATIENT
Start: 2024-01-01 | End: 2024-01-01

## 2024-01-01 RX ORDER — TIOTROPIUM BROMIDE 18 UG/1
2 CAPSULE ORAL; RESPIRATORY (INHALATION) DAILY
Refills: 0 | Status: DISCONTINUED | OUTPATIENT
Start: 2024-01-01 | End: 2024-01-01

## 2024-01-01 RX ORDER — ROCURONIUM BROMIDE 10 MG/ML
100 VIAL (ML) INTRAVENOUS ONCE
Refills: 0 | Status: COMPLETED | OUTPATIENT
Start: 2024-01-01 | End: 2024-01-01

## 2024-01-01 RX ORDER — ETOMIDATE 2 MG/ML
20 INJECTION INTRAVENOUS ONCE
Refills: 0 | Status: COMPLETED | OUTPATIENT
Start: 2024-01-01 | End: 2024-01-01

## 2024-01-01 RX ORDER — VANCOMYCIN HCL 1 G
1250 VIAL (EA) INTRAVENOUS ONCE
Refills: 0 | Status: DISCONTINUED | OUTPATIENT
Start: 2024-01-01 | End: 2024-01-01

## 2024-01-01 RX ORDER — ACETAMINOPHEN 500 MG
650 TABLET ORAL ONCE
Refills: 0 | Status: COMPLETED | OUTPATIENT
Start: 2024-01-01 | End: 2024-01-01

## 2024-01-01 RX ORDER — PROPOFOL 10 MG/ML
50 INJECTION, EMULSION INTRAVENOUS ONCE
Refills: 0 | Status: COMPLETED | OUTPATIENT
Start: 2024-01-01 | End: 2024-01-01

## 2024-01-01 RX ORDER — CEFPODOXIME PROXETIL 200 MG/1
200 TABLET, FILM COATED ORAL
Qty: 14 | Refills: 0 | Status: COMPLETED | COMMUNITY
Start: 2024-01-01 | End: 2024-01-01

## 2024-01-01 RX ORDER — ZOLPIDEM TARTRATE 10 MG/1
5 TABLET ORAL AT BEDTIME
Refills: 0 | Status: DISCONTINUED | OUTPATIENT
Start: 2024-01-01 | End: 2024-01-01

## 2024-01-01 RX ORDER — CHLORHEXIDINE GLUCONATE 213 G/1000ML
15 SOLUTION TOPICAL EVERY 12 HOURS
Refills: 0 | Status: DISCONTINUED | OUTPATIENT
Start: 2024-01-01 | End: 2024-01-01

## 2024-01-01 RX ORDER — ALTEPLASE 100 MG
50 KIT INTRAVENOUS ONCE
Refills: 0 | Status: COMPLETED | OUTPATIENT
Start: 2024-01-01 | End: 2024-01-01

## 2024-01-01 RX ORDER — ALBUTEROL 90 UG/1
2 AEROSOL, METERED ORAL
Qty: 0 | Refills: 0 | DISCHARGE
Start: 2024-01-01

## 2024-01-01 RX ORDER — PROPOFOL 10 MG/ML
20 INJECTION, EMULSION INTRAVENOUS
Qty: 1000 | Refills: 0 | Status: DISCONTINUED | OUTPATIENT
Start: 2024-01-01 | End: 2024-01-01

## 2024-01-01 RX ORDER — CEFTRIAXONE 500 MG/1
1000 INJECTION, POWDER, FOR SOLUTION INTRAMUSCULAR; INTRAVENOUS ONCE
Refills: 0 | Status: DISCONTINUED | OUTPATIENT
Start: 2024-01-01 | End: 2024-01-01

## 2024-01-01 RX ORDER — PANTOPRAZOLE SODIUM 20 MG/1
40 TABLET, DELAYED RELEASE ORAL
Refills: 0 | Status: DISCONTINUED | OUTPATIENT
Start: 2024-01-01 | End: 2024-01-01

## 2024-01-01 RX ORDER — MORPHINE SULFATE 50 MG/1
2 CAPSULE, EXTENDED RELEASE ORAL ONCE
Refills: 0 | Status: DISCONTINUED | OUTPATIENT
Start: 2024-01-01 | End: 2024-01-01

## 2024-01-01 RX ORDER — CEFTRIAXONE 500 MG/1
1000 INJECTION, POWDER, FOR SOLUTION INTRAMUSCULAR; INTRAVENOUS ONCE
Refills: 0 | Status: COMPLETED | OUTPATIENT
Start: 2024-01-01 | End: 2024-01-01

## 2024-01-01 RX ORDER — FENTANYL CITRATE 50 UG/ML
0.5 INJECTION INTRAVENOUS
Qty: 2500 | Refills: 0 | Status: DISCONTINUED | OUTPATIENT
Start: 2024-01-01 | End: 2024-01-01

## 2024-01-01 RX ORDER — PREDNISONE 10 MG/1
10 TABLET ORAL
Qty: 21 | Refills: 0 | Status: ACTIVE | COMMUNITY
Start: 2024-01-01 | End: 1900-01-01

## 2024-01-01 RX ORDER — HYDROCHLOROTHIAZIDE 25 MG
1 TABLET ORAL
Refills: 0 | DISCHARGE

## 2024-01-01 RX ORDER — MAGNESIUM SULFATE 500 MG/ML
2 VIAL (ML) INJECTION ONCE
Refills: 0 | Status: COMPLETED | OUTPATIENT
Start: 2024-01-01 | End: 2024-01-01

## 2024-01-01 RX ORDER — METOPROLOL TARTRATE 50 MG
75 TABLET ORAL
Refills: 0 | Status: DISCONTINUED | OUTPATIENT
Start: 2024-01-01 | End: 2024-01-01

## 2024-01-01 RX ORDER — ALPRAZOLAM 0.25 MG
0.25 TABLET ORAL ONCE
Refills: 0 | Status: DISCONTINUED | OUTPATIENT
Start: 2024-01-01 | End: 2024-01-01

## 2024-01-01 RX ORDER — HEPARIN SODIUM 5000 [USP'U]/ML
5000 INJECTION INTRAVENOUS; SUBCUTANEOUS EVERY 12 HOURS
Refills: 0 | Status: DISCONTINUED | OUTPATIENT
Start: 2024-01-01 | End: 2024-01-01

## 2024-01-01 RX ORDER — AZITHROMYCIN 250 MG/1
250 TABLET, FILM COATED ORAL
Qty: 15 | Refills: 0 | Status: COMPLETED | COMMUNITY
Start: 2024-01-01 | End: 2024-01-01

## 2024-01-01 RX ORDER — PREDNISONE 10 MG/1
10 TABLET ORAL
Qty: 42 | Refills: 0 | Status: ACTIVE | COMMUNITY
Start: 2024-01-01 | End: 1900-01-01

## 2024-01-01 RX ORDER — ATORVASTATIN CALCIUM 20 MG/1
20 TABLET, FILM COATED ORAL
Refills: 0 | Status: ACTIVE | COMMUNITY

## 2024-01-01 RX ORDER — METOPROLOL TARTRATE 50 MG
75 TABLET ORAL ONCE
Refills: 0 | Status: COMPLETED | OUTPATIENT
Start: 2024-01-01 | End: 2024-01-01

## 2024-01-01 RX ORDER — CHLORHEXIDINE GLUCONATE 213 G/1000ML
1 SOLUTION TOPICAL DAILY
Refills: 0 | Status: DISCONTINUED | OUTPATIENT
Start: 2024-01-01 | End: 2024-01-01

## 2024-01-01 RX ORDER — LEVALBUTEROL HYDROCHLORIDE 1.25 MG/3ML
1.25 SOLUTION RESPIRATORY (INHALATION)
Qty: 1085 | Refills: 3 | Status: ACTIVE | COMMUNITY
Start: 2024-01-01 | End: 1900-01-01

## 2024-01-01 RX ORDER — LOSARTAN POTASSIUM 100 MG/1
1 TABLET, FILM COATED ORAL
Refills: 0 | DISCHARGE

## 2024-01-01 RX ORDER — AZITHROMYCIN 500 MG/1
500 TABLET, FILM COATED ORAL ONCE
Refills: 0 | Status: DISCONTINUED | OUTPATIENT
Start: 2024-01-01 | End: 2024-01-01

## 2024-01-01 RX ORDER — ATORVASTATIN CALCIUM 80 MG/1
1 TABLET, FILM COATED ORAL
Refills: 0 | DISCHARGE

## 2024-01-01 RX ORDER — AZITHROMYCIN 500 MG/1
500 TABLET, FILM COATED ORAL DAILY
Refills: 0 | Status: DISCONTINUED | OUTPATIENT
Start: 2024-01-01 | End: 2024-01-01

## 2024-01-01 RX ORDER — VANCOMYCIN HCL 1 G
1000 VIAL (EA) INTRAVENOUS ONCE
Refills: 0 | Status: DISCONTINUED | OUTPATIENT
Start: 2024-01-01 | End: 2024-01-01

## 2024-01-01 RX ORDER — ALBUTEROL 90 UG/1
2 AEROSOL, METERED ORAL
Refills: 0 | Status: DISCONTINUED | OUTPATIENT
Start: 2024-01-01 | End: 2024-01-01

## 2024-01-01 RX ORDER — ALBUTEROL 90 UG/1
2 AEROSOL, METERED ORAL EVERY 6 HOURS
Refills: 0 | Status: DISCONTINUED | OUTPATIENT
Start: 2024-01-01 | End: 2024-01-01

## 2024-01-01 RX ORDER — PANTOPRAZOLE SODIUM 20 MG/1
1 TABLET, DELAYED RELEASE ORAL
Qty: 30 | Refills: 0
Start: 2024-01-01 | End: 2024-03-29

## 2024-01-01 RX ADMIN — Medication 40 MILLIGRAM(S): at 05:33

## 2024-01-01 RX ADMIN — Medication 600 MILLIGRAM(S): at 05:21

## 2024-01-01 RX ADMIN — Medication 75 MILLIGRAM(S): at 05:16

## 2024-01-01 RX ADMIN — Medication 40 MILLIGRAM(S): at 17:20

## 2024-01-01 RX ADMIN — CHLORHEXIDINE GLUCONATE 15 MILLILITER(S): 213 SOLUTION TOPICAL at 19:07

## 2024-01-01 RX ADMIN — Medication 600 MILLIGRAM(S): at 18:30

## 2024-01-01 RX ADMIN — PHENYLEPHRINE HYDROCHLORIDE 3.4 MICROGRAM(S)/KG/MIN: 10 INJECTION INTRAVENOUS at 01:04

## 2024-01-01 RX ADMIN — Medication 650 MILLIGRAM(S): at 22:22

## 2024-01-01 RX ADMIN — ALBUTEROL 2.5 MILLIGRAM(S): 90 AEROSOL, METERED ORAL at 19:54

## 2024-01-01 RX ADMIN — Medication 3 MILLILITER(S): at 08:39

## 2024-01-01 RX ADMIN — Medication 75 MILLIGRAM(S): at 05:24

## 2024-01-01 RX ADMIN — AZITHROMYCIN 255 MILLIGRAM(S): 500 TABLET, FILM COATED ORAL at 05:20

## 2024-01-01 RX ADMIN — PANTOPRAZOLE SODIUM 40 MILLIGRAM(S): 20 TABLET, DELAYED RELEASE ORAL at 05:24

## 2024-01-01 RX ADMIN — CEFTRIAXONE 1000 MILLIGRAM(S): 500 INJECTION, POWDER, FOR SOLUTION INTRAMUSCULAR; INTRAVENOUS at 19:59

## 2024-01-01 RX ADMIN — Medication 75 MILLIGRAM(S): at 05:47

## 2024-01-01 RX ADMIN — PIPERACILLIN AND TAZOBACTAM 25 GRAM(S): 4; .5 INJECTION, POWDER, LYOPHILIZED, FOR SOLUTION INTRAVENOUS at 17:19

## 2024-01-01 RX ADMIN — Medication 3 MILLILITER(S): at 02:43

## 2024-01-01 RX ADMIN — FENTANYL CITRATE 4.54 MICROGRAM(S)/KG/HR: 50 INJECTION INTRAVENOUS at 16:21

## 2024-01-01 RX ADMIN — Medication 3 MILLILITER(S): at 14:05

## 2024-01-01 RX ADMIN — Medication 40 MILLIGRAM(S): at 17:13

## 2024-01-01 RX ADMIN — Medication 3 MILLILITER(S): at 21:39

## 2024-01-01 RX ADMIN — PROPOFOL 10.9 MICROGRAM(S)/KG/MIN: 10 INJECTION, EMULSION INTRAVENOUS at 01:03

## 2024-01-01 RX ADMIN — Medication 40 MILLIGRAM(S): at 05:36

## 2024-01-01 RX ADMIN — PANTOPRAZOLE SODIUM 40 MILLIGRAM(S): 20 TABLET, DELAYED RELEASE ORAL at 05:21

## 2024-01-01 RX ADMIN — ALTEPLASE 3000 MILLIGRAM(S): KIT at 20:30

## 2024-01-01 RX ADMIN — PIPERACILLIN AND TAZOBACTAM 200 GRAM(S): 4; .5 INJECTION, POWDER, LYOPHILIZED, FOR SOLUTION INTRAVENOUS at 14:29

## 2024-01-01 RX ADMIN — BENZOCAINE AND MENTHOL 1 LOZENGE: 5; 1 LIQUID ORAL at 21:51

## 2024-01-01 RX ADMIN — AZITHROMYCIN 255 MILLIGRAM(S): 500 TABLET, FILM COATED ORAL at 05:22

## 2024-01-01 RX ADMIN — Medication 4 MILLILITER(S): at 08:07

## 2024-01-01 RX ADMIN — Medication 40 MILLIGRAM(S): at 13:20

## 2024-01-01 RX ADMIN — Medication 3 MILLILITER(S): at 20:45

## 2024-01-01 RX ADMIN — Medication 3 MILLILITER(S): at 15:12

## 2024-01-01 RX ADMIN — Medication 40 MILLIGRAM(S): at 23:08

## 2024-01-01 RX ADMIN — LOSARTAN POTASSIUM 100 MILLIGRAM(S): 100 TABLET, FILM COATED ORAL at 05:36

## 2024-01-01 RX ADMIN — LOSARTAN POTASSIUM 100 MILLIGRAM(S): 100 TABLET, FILM COATED ORAL at 05:17

## 2024-01-01 RX ADMIN — PROPOFOL 50 MILLIGRAM(S): 10 INJECTION, EMULSION INTRAVENOUS at 15:52

## 2024-01-01 RX ADMIN — Medication 600 MILLIGRAM(S): at 17:13

## 2024-01-01 RX ADMIN — Medication 40 MILLIGRAM(S): at 17:43

## 2024-01-01 RX ADMIN — ALBUTEROL 2.5 MILLIGRAM(S): 90 AEROSOL, METERED ORAL at 08:06

## 2024-01-01 RX ADMIN — AZITHROMYCIN 255 MILLIGRAM(S): 500 TABLET, FILM COATED ORAL at 05:36

## 2024-01-01 RX ADMIN — Medication 75 MILLIGRAM(S): at 05:21

## 2024-01-01 RX ADMIN — Medication 75 MILLIGRAM(S): at 17:43

## 2024-01-01 RX ADMIN — Medication 3 MILLILITER(S): at 08:27

## 2024-01-01 RX ADMIN — Medication 40 MILLIGRAM(S): at 05:20

## 2024-01-01 RX ADMIN — Medication 3 MILLILITER(S): at 03:28

## 2024-01-01 RX ADMIN — Medication 3 MILLILITER(S): at 08:16

## 2024-01-01 RX ADMIN — Medication 75 MILLIGRAM(S): at 05:34

## 2024-01-01 RX ADMIN — LOSARTAN POTASSIUM 100 MILLIGRAM(S): 100 TABLET, FILM COATED ORAL at 05:47

## 2024-01-01 RX ADMIN — CHLORHEXIDINE GLUCONATE 15 MILLILITER(S): 213 SOLUTION TOPICAL at 05:33

## 2024-01-01 RX ADMIN — ATORVASTATIN CALCIUM 20 MILLIGRAM(S): 80 TABLET, FILM COATED ORAL at 21:41

## 2024-01-01 RX ADMIN — AZITHROMYCIN 255 MILLIGRAM(S): 500 TABLET, FILM COATED ORAL at 23:07

## 2024-01-01 RX ADMIN — Medication 600 MILLIGRAM(S): at 05:23

## 2024-01-01 RX ADMIN — Medication 4 MILLILITER(S): at 13:50

## 2024-01-01 RX ADMIN — Medication 3 MILLILITER(S): at 20:54

## 2024-01-01 RX ADMIN — Medication 3 MILLILITER(S): at 08:04

## 2024-01-01 RX ADMIN — HEPARIN SODIUM 5000 UNIT(S): 5000 INJECTION INTRAVENOUS; SUBCUTANEOUS at 05:22

## 2024-01-01 RX ADMIN — PROPOFOL 10.9 MICROGRAM(S)/KG/MIN: 10 INJECTION, EMULSION INTRAVENOUS at 23:07

## 2024-01-01 RX ADMIN — PHENYLEPHRINE HYDROCHLORIDE 3.4 MICROGRAM(S)/KG/MIN: 10 INJECTION INTRAVENOUS at 05:31

## 2024-01-01 RX ADMIN — ATORVASTATIN CALCIUM 20 MILLIGRAM(S): 80 TABLET, FILM COATED ORAL at 22:53

## 2024-01-01 RX ADMIN — BUDESONIDE AND FORMOTEROL FUMARATE DIHYDRATE 2 PUFF(S): 160; 4.5 AEROSOL RESPIRATORY (INHALATION) at 19:55

## 2024-01-01 RX ADMIN — Medication 40 MILLIGRAM(S): at 05:23

## 2024-01-01 RX ADMIN — Medication 75 MILLIGRAM(S): at 17:21

## 2024-01-01 RX ADMIN — Medication 75 MILLIGRAM(S): at 17:13

## 2024-01-01 RX ADMIN — PIPERACILLIN AND TAZOBACTAM 25 GRAM(S): 4; .5 INJECTION, POWDER, LYOPHILIZED, FOR SOLUTION INTRAVENOUS at 23:08

## 2024-01-01 RX ADMIN — CEFTRIAXONE 1000 MILLIGRAM(S): 500 INJECTION, POWDER, FOR SOLUTION INTRAMUSCULAR; INTRAVENOUS at 12:09

## 2024-01-01 RX ADMIN — HEPARIN SODIUM 5000 UNIT(S): 5000 INJECTION INTRAVENOUS; SUBCUTANEOUS at 17:43

## 2024-01-01 RX ADMIN — ATORVASTATIN CALCIUM 20 MILLIGRAM(S): 80 TABLET, FILM COATED ORAL at 21:08

## 2024-01-01 RX ADMIN — Medication 3 MILLILITER(S): at 14:51

## 2024-01-01 RX ADMIN — Medication 600 MILLIGRAM(S): at 05:35

## 2024-01-01 RX ADMIN — Medication 600 MILLIGRAM(S): at 17:21

## 2024-01-01 RX ADMIN — PIPERACILLIN AND TAZOBACTAM 25 GRAM(S): 4; .5 INJECTION, POWDER, LYOPHILIZED, FOR SOLUTION INTRAVENOUS at 05:34

## 2024-01-01 RX ADMIN — HEPARIN SODIUM 5000 UNIT(S): 5000 INJECTION INTRAVENOUS; SUBCUTANEOUS at 05:36

## 2024-01-01 RX ADMIN — CISATRACURIUM BESYLATE 16.3 MICROGRAM(S)/KG/MIN: 2 INJECTION INTRAVENOUS at 23:08

## 2024-01-01 RX ADMIN — Medication 650 MILLIGRAM(S): at 21:52

## 2024-01-01 RX ADMIN — Medication 40 MILLIGRAM(S): at 05:46

## 2024-01-01 RX ADMIN — HEPARIN SODIUM 5000 UNIT(S): 5000 INJECTION INTRAVENOUS; SUBCUTANEOUS at 05:47

## 2024-01-01 RX ADMIN — Medication 40 MILLIGRAM(S): at 19:50

## 2024-01-01 RX ADMIN — Medication 75 MILLIGRAM(S): at 19:00

## 2024-01-01 RX ADMIN — Medication 3 MILLILITER(S): at 02:42

## 2024-01-01 RX ADMIN — Medication 40 MILLIGRAM(S): at 22:52

## 2024-01-01 RX ADMIN — Medication 3 MILLILITER(S): at 08:41

## 2024-01-01 RX ADMIN — HEPARIN SODIUM 5000 UNIT(S): 5000 INJECTION INTRAVENOUS; SUBCUTANEOUS at 18:26

## 2024-01-01 RX ADMIN — Medication 150 GRAM(S): at 13:51

## 2024-01-01 RX ADMIN — Medication 0.25 MILLIGRAM(S): at 04:45

## 2024-01-01 RX ADMIN — Medication 100 MILLIGRAM(S): at 17:00

## 2024-01-01 RX ADMIN — Medication 3 MILLILITER(S): at 14:36

## 2024-01-01 RX ADMIN — ETOMIDATE 20 MILLIGRAM(S): 2 INJECTION INTRAVENOUS at 15:51

## 2024-01-01 RX ADMIN — Medication 600 MILLIGRAM(S): at 05:36

## 2024-01-01 RX ADMIN — ALBUTEROL 2.5 MILLIGRAM(S): 90 AEROSOL, METERED ORAL at 03:44

## 2024-01-01 RX ADMIN — Medication 75 MILLIGRAM(S): at 18:26

## 2024-01-01 RX ADMIN — PANTOPRAZOLE SODIUM 40 MILLIGRAM(S): 20 TABLET, DELAYED RELEASE ORAL at 06:54

## 2024-01-01 RX ADMIN — Medication 3 MILLILITER(S): at 09:01

## 2024-01-01 RX ADMIN — PROPOFOL 10.9 MICROGRAM(S)/KG/MIN: 10 INJECTION, EMULSION INTRAVENOUS at 05:24

## 2024-01-01 RX ADMIN — Medication 2 MILLIGRAM(S): at 10:38

## 2024-01-01 RX ADMIN — HEPARIN SODIUM 0 UNIT(S)/HR: 5000 INJECTION INTRAVENOUS; SUBCUTANEOUS at 05:32

## 2024-01-01 RX ADMIN — PROPOFOL 10.9 MICROGRAM(S)/KG/MIN: 10 INJECTION, EMULSION INTRAVENOUS at 05:32

## 2024-01-01 RX ADMIN — Medication 3 MILLILITER(S): at 13:51

## 2024-01-01 RX ADMIN — HEPARIN SODIUM 1700 UNIT(S)/HR: 5000 INJECTION INTRAVENOUS; SUBCUTANEOUS at 01:00

## 2024-01-01 RX ADMIN — HEPARIN SODIUM 1400 UNIT(S)/HR: 5000 INJECTION INTRAVENOUS; SUBCUTANEOUS at 06:31

## 2024-01-01 RX ADMIN — LOSARTAN POTASSIUM 100 MILLIGRAM(S): 100 TABLET, FILM COATED ORAL at 05:21

## 2024-01-01 RX ADMIN — AZITHROMYCIN 255 MILLIGRAM(S): 500 TABLET, FILM COATED ORAL at 05:40

## 2024-01-01 RX ADMIN — MORPHINE SULFATE 2 MILLIGRAM(S): 50 CAPSULE, EXTENDED RELEASE ORAL at 15:46

## 2024-01-01 RX ADMIN — ATORVASTATIN CALCIUM 20 MILLIGRAM(S): 80 TABLET, FILM COATED ORAL at 21:13

## 2024-01-01 RX ADMIN — Medication 3 MILLILITER(S): at 02:59

## 2024-01-01 RX ADMIN — LOSARTAN POTASSIUM 100 MILLIGRAM(S): 100 TABLET, FILM COATED ORAL at 05:23

## 2024-01-01 RX ADMIN — PANTOPRAZOLE SODIUM 40 MILLIGRAM(S): 20 TABLET, DELAYED RELEASE ORAL at 05:36

## 2024-01-01 RX ADMIN — Medication 75 MILLIGRAM(S): at 19:59

## 2024-01-01 RX ADMIN — ALBUTEROL 2.5 MILLIGRAM(S): 90 AEROSOL, METERED ORAL at 13:50

## 2024-01-01 RX ADMIN — Medication 600 MILLIGRAM(S): at 05:17

## 2024-01-01 RX ADMIN — Medication 100 MILLIGRAM(S): at 15:52

## 2024-01-01 RX ADMIN — PANTOPRAZOLE SODIUM 40 MILLIGRAM(S): 20 TABLET, DELAYED RELEASE ORAL at 05:51

## 2024-01-01 RX ADMIN — HEPARIN SODIUM 5000 UNIT(S): 5000 INJECTION INTRAVENOUS; SUBCUTANEOUS at 17:20

## 2024-01-01 RX ADMIN — Medication 4 MILLILITER(S): at 19:54

## 2024-01-01 RX ADMIN — Medication 40 MILLIGRAM(S): at 05:35

## 2024-01-01 RX ADMIN — Medication 3 MILLILITER(S): at 21:10

## 2024-01-01 RX ADMIN — Medication 125 MILLIGRAM(S): at 08:27

## 2024-01-01 RX ADMIN — HYDROMORPHONE HYDROCHLORIDE 2 MG/HR: 2 INJECTION INTRAMUSCULAR; INTRAVENOUS; SUBCUTANEOUS at 10:38

## 2024-01-01 RX ADMIN — HEPARIN SODIUM 5000 UNIT(S): 5000 INJECTION INTRAVENOUS; SUBCUTANEOUS at 17:13

## 2024-01-01 RX ADMIN — CISATRACURIUM BESYLATE 16.3 MICROGRAM(S)/KG/MIN: 2 INJECTION INTRAVENOUS at 05:33

## 2024-01-01 RX ADMIN — Medication 4 MILLILITER(S): at 03:45

## 2024-01-01 RX ADMIN — Medication 3 MILLILITER(S): at 03:57

## 2024-01-01 RX ADMIN — Medication 600 MILLIGRAM(S): at 17:43

## 2024-01-01 RX ADMIN — Medication 3 MILLILITER(S): at 05:46

## 2024-01-01 RX ADMIN — Medication 3 MILLILITER(S): at 15:34

## 2024-01-01 RX ADMIN — PROPOFOL 10.9 MICROGRAM(S)/KG/MIN: 10 INJECTION, EMULSION INTRAVENOUS at 16:07

## 2024-01-01 RX ADMIN — Medication 600 MILLIGRAM(S): at 19:00

## 2024-01-01 RX ADMIN — Medication 125 MILLIGRAM(S): at 13:51

## 2024-01-01 RX ADMIN — ATORVASTATIN CALCIUM 20 MILLIGRAM(S): 80 TABLET, FILM COATED ORAL at 21:04

## 2024-01-01 RX ADMIN — AZITHROMYCIN 255 MILLIGRAM(S): 500 TABLET, FILM COATED ORAL at 06:03

## 2024-01-01 RX ADMIN — Medication 50 MILLIGRAM(S): at 05:17

## 2024-01-01 RX ADMIN — HEPARIN SODIUM 5000 UNIT(S): 5000 INJECTION INTRAVENOUS; SUBCUTANEOUS at 05:23

## 2024-01-01 RX ADMIN — Medication 3 MILLILITER(S): at 21:02

## 2024-01-01 RX ADMIN — Medication 75 MILLIGRAM(S): at 05:36

## 2024-01-08 PROBLEM — R05.3 CHRONIC COUGH: Status: ACTIVE | Noted: 2021-01-12

## 2024-01-29 NOTE — HISTORY OF PRESENT ILLNESS
[Former] : former [>= 20 pack years] : >= 20 pack years [TextBox_4] : RZ   patient  1/29/2024 74y/o  male   born in [place]  ex smoker  1.5 ppd > 50  years   retired    medical  equipement  repair  here for COPD exacerbation  - had URI 1/1/24  then  cough  given  prednisone  - vaccination   flu    pneumonia  no RSV - finished prednisone taper  -  flood  zone   was   flooded last  week -  cough  beige   hint pink  - no taiwo blood  - no fever -never had bone density  -  [TextBox_11] : 1.5 [TextBox_13] : 50

## 2024-01-29 NOTE — REVIEW OF SYSTEMS
[Cough] : cough [Sputum] : sputum [Dyspnea] : dyspnea [Wheezing] : wheezing [SOB on Exertion] : sob on exertion [Obesity] : obesity [Fever] : no fever [Recent Wt Gain (___ Lbs)] : ~T no recent weight gain [Chills] : no chills [Recent Wt Loss (___ Lbs)] : ~T no recent weight loss [Hemoptysis] : no hemoptysis [Chest Discomfort] : no chest discomfort [Palpitations] : no palpitations [GERD] : no gerd [Abdominal Pain] : no abdominal pain [Nausea] : no nausea [Vomiting] : no vomiting [Dysphagia] : no dysphagia [Bleeding] : no bleeding [Chronic Pain] : no chronic pain [Rash] : no rash [Blood Transfusion] : no blood transfusion [Clotting Disorder/ Frequent bleeding] : no clotting disorder/ frequent bleeding [Diabetes] : no diabetes [Thyroid Problem] : no thyroid problem

## 2024-01-29 NOTE — PHYSICAL EXAM
[Enlarged Base of the Tongue] : enlarged base of the tongue [IV] : Mallampati Class: IV [Normal Appearance] : normal appearance [Supple] : supple [No Neck Mass] : no neck mass [No JVD] : no jvd [Normal Rate/Rhythm] : normal rate/rhythm [Normal S1, S2] : normal s1, s2 [No Resp Distress] : no resp distress [No Acc Muscle Use] : no acc muscle use [Benign] : benign [Not Tender] : not tender [No Masses] : no masses [Soft] : soft [Normal Gait] : normal gait [No Clubbing] : no clubbing [No Edema] : no edema [No Rash] : no rash [No Motor Deficits] : no motor deficits [Normal Affect] : normal affect [TextBox_2] : pleasant male no distress   bronchial  cough  [TextBox_11] : no thrush  crowded  [TextBox_68] : bilateral   air entry wheezing

## 2024-01-29 NOTE — ASSESSMENT
[FreeTextEntry1] : 72y/o  male  1- COPD  exacerbation   after    infection  2- ex smoker > 50 pack years 3- h/o  allergic rhinits 4- h/o   defibrillator    recommendations 1-  prednisone taper 2- s/p vantin  3- add z collins then  tiw  4- add nebulizer xopenex   q 8 hour 5- cxr 6- bone density  7- d dimer   vit d levels    to go to ED if chest pain or  pressure worsening sob - agreement on plan -  prevention discussed steroids use discussed and   chart reviewed

## 2024-01-31 PROBLEM — R79.89 D-DIMER, ELEVATED: Status: ACTIVE | Noted: 2024-01-01

## 2024-02-05 PROBLEM — R93.89 ABNORMAL CHEST CT: Status: ACTIVE | Noted: 2024-01-01

## 2024-02-23 NOTE — ED PROVIDER NOTE - NSFOLLOWUPINSTRUCTIONS_ED_ALL_ED_FT
Please take all medications as prescribed    1)Follow up with your PCP in 1 week for evaluation of your kidney function (elevated creatinine)  2) Follow up with Dr. Ibanez in 1 week  3) Follow up with cardiology clinic within 1 week  Return to the emergency room if you are experiencing any new or worsening symptoms      SEEK IMMEDIATE MEDICAL CARE IF YOU HAVE ANY OF THE FOLLOWING SYMPTOMS: worsening of symptoms, shortness of breath at rest, chest pain, bluish discoloration to lips or fingertips, lightheadedness/dizziness, or fever.

## 2024-02-23 NOTE — ED PROVIDER NOTE - PHYSICAL EXAMINATION
Const: Awake, alert and oriented. In no acute distress. Well appearing.  HEENT: NC/AT. Moist mucous membranes.  Eyes: No scleral icterus. EOMI.  Neck:. Soft and supple. Full ROM without pain.  Cardiac: Regular rate and regular rhythm. +S1/S2. Peripheral pulses 2+ and symmetric. No LE edema.  Resp: Speaking in full sentences. No evidence of respiratory distress. No wheezes, rales or rhonchi. decreased bs BL  Abd: Soft, non-tender, non-distended. Normal bowel sounds in all 4 quadrants. No guarding or rebound.  Back: Spine midline and non-tender. No CVAT.  Skin: No rashes, abrasions or lacerations.  Lymph: No cervical lymphadenopathy.  Neuro: Awake, alert & oriented x 3. Moves all extremities symmetrically.

## 2024-02-23 NOTE — ED PROVIDER NOTE - PATIENT PORTAL LINK FT
You can access the FollowMyHealth Patient Portal offered by [facility] by registering at the following website: [email or web address]. By joining [facility]’s FollowMyHealth portal, you will also be able to view your health information using other applications (apps) compatible with our system.

## 2024-02-23 NOTE — ED PROVIDER NOTE - NSFOLLOWUPCLINICS_GEN_ALL_ED_FT
[facility]  Cardiology  [address] [place] [address]  Phone: [phone]  Fax:   Follow Up Time: 7-10 Days

## 2024-02-23 NOTE — ED PROVIDER NOTE - CARE PLAN
1 Principal Discharge DX:	Bronchitis  Secondary Diagnosis:	Elevated serum creatinine   Principal Discharge DX:	COPD exacerbation  Secondary Diagnosis:	Elevated serum creatinine  Secondary Diagnosis:	COPD with hypoxia   Principal Discharge DX:	COPD with hypoxia  Secondary Diagnosis:	Elevated serum creatinine

## 2024-02-23 NOTE — ED ADULT NURSE NOTE - OBJECTIVE STATEMENT
Assumed care at 1350 pt co SOB for a couple of days, was diagnosed with an upper respiratory virus and hasn't been able to recover from it, pt has COPD not on home oxygen, uses nebulizer treatement with no relief, pt placed on cardiac monitor.

## 2024-02-23 NOTE — ED PROVIDER NOTE - CLINICAL SUMMARY MEDICAL DECISION MAKING FREE TEXT BOX
74-year-old male with a past medical history of COPD and arrhythmia status post pacemaker presents with cough and shortness of breath.   Patient has already been ruled out for CHF as well as for PE.  Patient likely with chronic bronchitis potentially worsening COPD.  Will give stress dose of steroids as well as DuoNebs and magnesium.  Will also do a chest x-ray and basic blood work.

## 2024-02-23 NOTE — ED ADULT NURSE REASSESSMENT NOTE - NS ED NURSE REASSESS COMMENT FT1
Upon obtaining DC vital pt SpO2 fluctuated between 87 and 90%. PA at bedside expressed to pt that she is not comfortable discharging him at this time. Pt placed on  monitor. Pt denies SOB and difficulty breathing. RR even and unlabored. NAD noted. Bed in lowest locked position, able to make needs known.

## 2024-02-23 NOTE — ED PROVIDER NOTE - CARE PROVIDER_API CALL
show
Peter Reyes  Pulmonary Disease  [address] [facility], [address]  [place] [address]  Phone: [phone]  Fax: [phone]  Follow Up Time: 7-10 Days

## 2024-02-23 NOTE — ED ADULT NURSE REASSESSMENT NOTE - NS ED NURSE REASSESS COMMENT FT1
assumed care of pt from LINDA Davies. Pt is a&ox3, resting comfortably in bed in no acute distress. Resp even and unlabored. Pt c/o SOB upon exertion. Pt noted to become SOB when speaking in full sentences. Pt denies any chest pain, headaches, dizziness, or N/V/D at the moment. Pt remains on  and spo2 noted to be around 88%. DAWIT Toribio made aware, pt to be admitted. Pt aware of plan of care and verbalizes understanding. Safety maintained

## 2024-02-23 NOTE — ED PROVIDER NOTE - OBJECTIVE STATEMENT
74-year-old male with a past medical history of COPD and arrhythmia status post pacemaker presents with cough and shortness of breath.  Patient states since January with cough shortness of breath.  Patient went to see his pulmonologist Dr. Castillo was found to have an elevated D-dimer then a CT to rule out PE was done there was no PE but patient was found to have some mucous plugging as well as possible aspiration pneumonitis.  Patient also had lower extremity Dopplers which were negative for DVT.  Patient has been taking albuterol, Trelegy, azithromycin but states he continues to feel short of breath.  Patient follows up with cardiology as well had an echo done in December which was normal.  Patient denies history of heart failure.  Patient denies lower extremity edema. Pt denies fevers/chills, ha, loc, focal neuro deficits, cp/palp, abd pain/n/v/d, urinary symptoms, recent travel

## 2024-02-23 NOTE — ED ADULT TRIAGE NOTE - CHIEF COMPLAINT QUOTE
January 7th diagnosed with viral illness treated with antibiotics and steroids with no relief. Pt endorses SOB and abd pain. PMH COPD. Pt speaking in full sentences.

## 2024-02-24 NOTE — PROGRESS NOTE ADULT - SUBJECTIVE AND OBJECTIVE BOX
[facility] Division of Hospital Medicine    Chief Complaint:   sob    SUBJECTIVE / OVERNIGHT EVENTS:    Patient c/o SOB. Denies chest pain, abd pain, N/V, fever, chills, dysuria or any other complaints. All remainder ROS negative.     MEDICATIONS  (STANDING):  albuterol/ipratropium for Nebulization 3 milliLiter(s) Nebulizer every 6 hours  atorvastatin 20 milliGRAM(s) Oral at bedtime  azithromycin  IVPB 500 milliGRAM(s) IV Intermittent every 24 hours  guaiFENesin  milliGRAM(s) Oral every 12 hours  heparin   Injectable 5000 Unit(s) SubCutaneous every 12 hours  hydrochlorothiazide 25 milliGRAM(s) Oral daily  losartan 100 milliGRAM(s) Oral daily  methylPREDNISolone sodium succinate Injectable 40 milliGRAM(s) IV Push every 8 hours  metoprolol tartrate 75 milliGRAM(s) Oral two times a day  pantoprazole    Tablet 40 milliGRAM(s) Oral before breakfast    MEDICATIONS  (PRN):  albuterol    90 MICROgram(s) HFA Inhaler 2 Puff(s) Inhalation every 3 hours PRN Shortness of Breath and/or Wheezing        I&O's Summary      PHYSICAL EXAM:  Vital Signs Last 24 Hrs  T(C): 36.8 (24 Feb 2024 15:30), Max: 36.8 (23 Feb 2024 23:30)  T(F): 98.2 (24 Feb 2024 15:30), Max: 98.3 (24 Feb 2024 07:44)  HR: 92 (24 Feb 2024 15:30) (61 - 95)  BP: 130/71 (24 Feb 2024 15:30) (108/60 - 160/51)  BP(mean): 90 (24 Feb 2024 05:44) (90 - 90)  RR: 18 (24 Feb 2024 15:30) (18 - 20)  SpO2: 90% (24 Feb 2024 15:30) (87% - 94%)    Parameters below as of 24 Feb 2024 15:30  Patient On (Oxygen Delivery Method): nasal cannula  O2 Flow (L/min): 2          General: Age-appearing, in no acute distress  Head: Normocephalic, atraumatic  ENMT: EOMI, no scleral icterus, neck supple, no JVD  Cardiovascular: +S1, S2; Regular rate and rhythm, no murmurs.  Respiratory: mild b/l exp wheezing, b/l decrease breath sounds.   Gastrointestinal: soft, ND, NT, (+) BS, (-) rebound  Neuro: AAOx3, CN2-12 intact, no FND  Musculoskeletal: Normal tone, no deformities  Skin: Warm, dry, no rash, no jaundice  Psych: Calm, cooperative     LABS:                        15.3   8.53  )-----------( 131      ( 23 Feb 2024 13:30 )             43.0     02-24    133<L>  |  93<L>  |  27.1<H>  ----------------------------<  190<H>  4.9   |  23.0  |  1.42<H>    Ca    9.9      24 Feb 2024 06:24  Mg     1.8     02-23    TPro  7.2  /  Alb  4.1  /  TBili  0.4  /  DBili  x   /  AST  26  /  ALT  17  /  AlkPhos  77  02-24          Urinalysis Basic - ( 24 Feb 2024 06:24 )    Color: x / Appearance: x / SG: x / pH: x  Gluc: 190 mg/dL / Ketone: x  / Bili: x / Urobili: x   Blood: x / Protein: x / Nitrite: x   Leuk Esterase: x / RBC: x / WBC x   Sq Epi: x / Non Sq Epi: x / Bacteria: x        Culture - Urine (collected 23 Feb 2024 13:30)  Source: Clean Catch Clean Catch (Midstream)  Final Report (24 Feb 2024 13:51):    <10,000 CFU/mL Normal Urogenital Sommer      CAPILLARY BLOOD GLUCOSE            RADIOLOGY & ADDITIONAL TESTS:  Results Reviewed:   Imaging Personally Reviewed:  Electrocardiogram Personally Reviewed:

## 2024-02-24 NOTE — H&P ADULT - ASSESSMENT
73 yo male, hx of COPD not on home o2, CHF s/p AICD/PPM, HTN, HLD presenting for worsening dyspnea with associated cough, found to be hypoxic concerning for COPD exacerbation for patient was admitted, incidentally found to have chest ct findings concerning for possible lung cancer for which patient also to be further worked up.    Admit to Medicine. Dr. Hudson     Vitals per routine  ambulate as tolerated  dash.tlc diet    #) Hypoxic respiratory failure due to COPD exacerbation  not on home o2 at baseline   outpatient pulmonary records reviewed in allscripts from jan and feb 2024  s/p duonebs, solumedrol, mag in ED  c/w solumedrol 40q8  c/w duonebs with prn albuterol mdi  c/w zithromax 500mg IV daily   ambulatory O2 sat; home O2 if < 88%  pulm consult pending    #) Endobronchial lesion  noted in the right mainstem bronchus/right upper lobe bronchus  CT Chest findings reviewed with patient  upon review of CTA Chest ordered earlier this month as well as CT Low dose lung cancer screening, there was noted mucous deposition, plugging noted in right upper lobe/bronchus   concern for possible malignancy  pulm consult pending  may eventually need CT sx consult if bronch to be done inpatient but will defer to pulm for initial evaluation first in AM    #) HTN  c/w home losartan and hctz and beta blocker    #) HLD   c/w home statin     VTE PPX: heparin subq bid  GI PPX: on protonix while on steroids     Dispo: pending pulm eval, improvement in spo2    Plan of care discussed with patient, RN at bedside.

## 2024-02-24 NOTE — H&P ADULT - NSICDXPASTMEDICALHX_GEN_ALL_CORE_FT
PAST MEDICAL HISTORY:  Advanced COPD     Benign essential HTN     Chronic systolic congestive heart failure     HLD (hyperlipidemia)

## 2024-02-24 NOTE — PROGRESS NOTE ADULT - ASSESSMENT
73 yo male, hx of COPD not on home o2, CHF s/p AICD/PPM, HTN, HLD presenting for worsening dyspnea with associated cough, found to be hypoxic concerning for COPD exacerbation for patient was admitted, incidentally found to have chest ct findings concerning for possible lung cancer for which patient also to be further worked up.    Admit to Medicine. Dr. Hudson     Vitals per routine  ambulate as tolerated  dash.tlc diet    #) Hypoxic respiratory failure due to COPD exacerbation  not on home o2 at baseline   outpatient pulmonary records reviewed in allscripts from jan and feb 2024  s/p duonebs, solumedrol, mag in ED  c/w solumedrol 40q8  c/w duonebs with prn albuterol mdi  c/w zithromax 500mg IV daily   ambulatory O2 sat; home O2 if < 88%  pulm consult pending  Mucinex q12  Chest PT, incentive spirometry    #) Endobronchial lesion  noted in the right mainstem bronchus/right upper lobe bronchus  CT Chest findings reviewed with patient  upon review of CTA Chest ordered earlier this month as well as CT Low dose lung cancer screening, there was noted mucous deposition, plugging noted in right upper lobe/bronchus   concern for possible malignancy  pulm consult pending  may eventually need CT sx consult if bronch to be done inpatient but will defer to pulm for initial evaluation first in AM    #) HTN  c/w home losartan and hctz and beta blocker    #) HLD   c/w home statin     VTE PPX: heparin subq bid  GI PPX: on protonix while on steroids     Dispo: pending pulm eval, improvement in spo2    Plan of care discussed with patient, RN at bedside.

## 2024-02-24 NOTE — H&P ADULT - HISTORY OF PRESENT ILLNESS
73 yo male, hx of COPD not on home o2, CHF s/p AICD/PPM, HTN, HLD presenting for worsening dyspnea with associated cough. Says that for last 6-8 weeks he has been having cough and sob that initially started with uri symptoms. Says he followed up with his pulm, was started on prednisone and abx. Says symptoms had mildly improved a bit but after 2-3 weeks, still was coughing and having dyspnea and was started again on another round of prednisone and azithro. He had labs drawn with elevated Dimer for which pulm sent him for DVT/PE studies that were negative. CTA did note right upper bronchus mucous plugging. Patient says that he presented today to ED because he feels that his dyspnea was worsening even at rest. Says that he originally had blood streaked sputum that cleared but still gets some pink colored sputum. Denied fevers, chills, wt loss, night sweats, chest pain, palpitations, abdominal pain, n/v/d, dysuria, numbness, headaches, dizziness, blurry vision.     In ED: noted hypoxic 87%, given solumedrol, duonebs, rocephin and mag. CT Chest done showing Endobronchial lesion is noted in the right mainstem bronchus/right upper lobe bronchus. Exact etiology is unclear. Primary consideration is lung carcinoma. Admit to Medicine called.

## 2024-02-24 NOTE — H&P ADULT - NSICDXFAMILYHX_GEN_ALL_CORE_FT
FAMILY HISTORY:  Father  Still living? Unknown  FH: prostate cancer, Age at diagnosis: Age Unknown    Mother  Still living? Unknown  FH: COPD (chronic obstructive pulmonary disease), Age at diagnosis: Age Unknown

## 2024-02-24 NOTE — H&P ADULT - NSHPPHYSICALEXAM_GEN_ALL_CORE
Vital Signs Last 24 Hrs  T(F): 98.2 (23 Feb 2024 23:30), Max: 98.2 (23 Feb 2024 23:30)  HR: 71 (23 Feb 2024 23:30) (71 - 93)  BP: 142/71 (23 Feb 2024 23:30) (135/74 - 153/81)  RR: 18 (23 Feb 2024 23:30) (18 - 20)  SpO2: 91% (23 Feb 2024 23:30) (87% - 92%)    Physical Exam:  Constitutional: NAD, awake and alert, well groomed  Eyes: EOMI, PERRLA  Mouth: Moist oral mucosa, No erythema or exudates noted. NC o2 applied   Neck: Soft and supple, No LAD  Respiratory: cta b/l no wheezing no rhonchi  Cardiovascular: +s1/s2 RRR, no murmurs appreciated. no edema b/l le  Gastrointestinal: soft nt nd bs+  Vascular: 2+ peripheral pulses, no calf tenderness b/l.   Neurological: A/O x 3, no focal deficits  Musculoskeletal: 5/5 strength b/l upper and lower extremities  Skin: warm, dry well perfused. No sacral ulcers noted  Psych: has insight into current condition. normal affect. mood appropriate. Vital Signs Last 24 Hrs  T(F): 98.2 (23 Feb 2024 23:30), Max: 98.2 (23 Feb 2024 23:30)  HR: 71 (23 Feb 2024 23:30) (71 - 93)  BP: 142/71 (23 Feb 2024 23:30) (135/74 - 153/81)  RR: 18 (23 Feb 2024 23:30) (18 - 20)  SpO2: 91% (23 Feb 2024 23:30) (87% - 92%)    Physical Exam:  Constitutional: NAD, awake and alert, well groomed  Eyes: EOMI, PERRLA  Mouth: Moist oral mucosa, No erythema or exudates noted. NC o2 applied   Neck: Soft and supple, No LAD  Respiratory: cta b/l no wheezing no rhonchi  Cardiovascular: +s1/s2 RRR, no murmurs appreciated. no edema b/l le, left upper chest PPM  Gastrointestinal: soft nt nd bs+  Vascular: 2+ peripheral pulses, no calf tenderness b/l.   Neurological: A/O x 3, no focal deficits  Musculoskeletal: 5/5 strength b/l upper and lower extremities  Skin: warm, dry well perfused. No sacral ulcers noted  Psych: has insight into current condition. normal affect. mood appropriate.

## 2024-02-25 NOTE — CONSULT NOTE ADULT - SUBJECTIVE AND OBJECTIVE BOX
PULMONARY CONSULT NOTE      [id number]    Patient is a 74y old  Male who presents with a chief complaint of COPD exacerbation, (25 Feb 2024 11:20)      HISTORY OF PRESENT ILLNESS:    75 yo male, hx of COPD not on home o2, CHF s/p AICD/PPM, HTN, HLD presenting for worsening dyspnea with associated cough. Says that for last 6-8 weeks he has been having cough and sob that initially started with uri symptoms. Says he followed up with his pulm (Dr Kay for Dr Reyes, was started on prednisone and abx as well as continuing Trelegy.  Says symptoms had mildly improved a bit but after 2-3 weeks, still was coughing and having dyspnea and was started again on another round of prednisone and azithro transitioned to M,W,F. He had labs drawn with elevated D- Dimer for which pulm sent him for DVT/PE studies that were negative. CTA did note right upper bronchus mucous plugging. Patient says that he presented today to ED because he feels that his dyspnea was worsening even at rest. Says that he originally had blood streaked sputum that cleared but still gets some pink colored sputum. Denied fevers, chills, wt loss, night sweats, chest pain, palpitations, abdominal pain, n/v/d, dysuria, numbness, headaches, dizziness, blurry vision.     In ED: noted hypoxic 87%, given solumedrol, duonebs, rocephin and mag. CT Chest done showing Endobronchial lesion is noted in the right mainstem bronchus/right upper lobe bronchus. Exact etiology is unclear. Primary consideration is lung carcinoma. Admit to Medicine called.  At least moderate COPD  LDCT 10/23 with plugging and low suspicion for malignancy  75 pack year smoker - DC about 2000    MEDICATIONS  (STANDING):  albuterol/ipratropium for Nebulization 3 milliLiter(s) Nebulizer every 6 hours  atorvastatin 20 milliGRAM(s) Oral at bedtime  azithromycin  IVPB 500 milliGRAM(s) IV Intermittent every 24 hours  guaiFENesin  milliGRAM(s) Oral every 12 hours  heparin   Injectable 5000 Unit(s) SubCutaneous every 12 hours  hydrochlorothiazide 25 milliGRAM(s) Oral daily  losartan 100 milliGRAM(s) Oral daily  methylPREDNISolone sodium succinate Injectable 40 milliGRAM(s) IV Push every 12 hours  metoprolol tartrate 75 milliGRAM(s) Oral two times a day  pantoprazole    Tablet 40 milliGRAM(s) Oral before breakfast      MEDICATIONS  (PRN):  albuterol    90 MICROgram(s) HFA Inhaler 2 Puff(s) Inhalation every 3 hours PRN Shortness of Breath and/or Wheezing      Allergies    No Known Allergies    Intolerances        PAST MEDICAL & SURGICAL HISTORY:  Chronic systolic congestive heart failure      Benign essential HTN      HLD (hyperlipidemia)      Advanced COPD      H/O removal of cyst          FAMILY HISTORY:  FH: prostate cancer (Father)    FH: COPD (chronic obstructive pulmonary disease) (Mother)        SOCIAL HISTORY  Smoking History:     REVIEW OF SYSTEMS:  See H&P    Vital Signs Last 24 Hrs  T(C): 36.9 (25 Feb 2024 11:16), Max: 36.9 (25 Feb 2024 11:16)  T(F): 98.4 (25 Feb 2024 11:16), Max: 98.4 (25 Feb 2024 11:16)  HR: 79 (25 Feb 2024 11:16) (69 - 94)  BP: 136/68 (25 Feb 2024 11:16) (102/57 - 141/67)  BP(mean): --  RR: 18 (25 Feb 2024 11:16) (18 - 19)  SpO2: 92% (25 Feb 2024 11:16) (86% - 96%)    Parameters below as of 25 Feb 2024 11:16  Patient On (Oxygen Delivery Method): nasal cannula  O2 Flow (L/min): 4      PHYSICAL EXAMINATION:    GENERAL: The patient is a well-developed, well-nourished male in no apparent distress. PATEL    HEENT: Head is normocephalic and atraumatic.     NECK: Supple.     LUNGS: Diminished to auscultation without wheezing, rales, or rhonchi. Respirations unlabored    HEART: Regular rate and rhythm without murmur.    ABDOMEN: Soft, nontender, and nondistended.  No hepatosplenomegaly is noted.    EXTREMITIES: Without any cyanosis, clubbing, rash, lesions or edema.    NEUROLOGIC: Grossly intact.      LABS:                        13.7   9.04  )-----------( 106      ( 25 Feb 2024 04:00 )             39.1     02-25    134<L>  |  96  |  35.0<H>  ----------------------------<  186<H>  4.8   |  25.0  |  1.63<H>    Ca    9.4      25 Feb 2024 04:00  Mg     1.8     02-23    TPro  7.2  /  Alb  4.1  /  TBili  0.4  /  DBili  x   /  AST  26  /  ALT  17  /  AlkPhos  77  02-24      Urinalysis Basic - ( 25 Feb 2024 04:00 )    Color: x / Appearance: x / SG: x / pH: x  Gluc: 186 mg/dL / Ketone: x  / Bili: x / Urobili: x   Blood: x / Protein: x / Nitrite: x   Leuk Esterase: x / RBC: x / WBC x   Sq Epi: x / Non Sq Epi: x / Bacteria: x      MICROBIOLOGY: RVP and SARS-CoV-2 neg    RADIOLOGY & ADDITIONAL STUDIES:  IMPRESSION: Endobronchial lesion is noted in the right mainstem   bronchus/right upper lobe bronchus. Exact etiology is unclear. Primary   consideration is lung carcinoma.    IRA VARGAS MD; Attending Radiologist  This document has been electronically signed. Feb 23 2024  9:00PM

## 2024-02-25 NOTE — CONSULT NOTE ADULT - PROBLEM SELECTOR RECOMMENDATION 9
CT chest with endobronchial lesion.   Imaging reviewed by Dr. Espitia, plan for bronchoscopy next week possibly Tues/Wed.   Continue supportive care, supplement O2 as needed.   Continuous SpO2 monitoring.  Maintain SpO2 >92%, supplement with O2 therapy PRN.  Continue Abx per pulmonary.   Thoracic surgery will follow.   Plan discussed with Dr. Espitia CT chest with findings of endobronchial lesion.   Imaging reviewed by Dr. Espitia, plan for bronchoscopy next week possibly Tues/Wed.   Continue supportive care, supplement O2 as needed.   Continuous SpO2 monitoring.  Maintain SpO2 >92%, supplement with O2 therapy PRN.  Continue Abx per pulmonary.   Thoracic surgery will follow.   Plan discussed with Dr. Espitia.

## 2024-02-25 NOTE — DISCHARGE NOTE NURSING/CASE MANAGEMENT/SOCIAL WORK - PATIENT PORTAL LINK FT
You can access the FollowMyHealth Patient Portal offered by [facility] by registering at the following website: [email or web address]. By joining "[email or web address]’s FollowMyHealth portal, you will also be able to view your health information using other applications (apps) compatible with our system.

## 2024-02-25 NOTE — PROGRESS NOTE ADULT - SUBJECTIVE AND OBJECTIVE BOX
[facility] Division of Hospital Medicine    Chief Complaint:   sob    SUBJECTIVE / OVERNIGHT EVENTS:    Patient c/o mild SOB. Denies chest pain, abd pain, N/V, fever, chills, dysuria or any other complaints. All remainder ROS negative.     MEDICATIONS  (STANDING):  albuterol/ipratropium for Nebulization 3 milliLiter(s) Nebulizer every 6 hours  atorvastatin 20 milliGRAM(s) Oral at bedtime  azithromycin  IVPB 500 milliGRAM(s) IV Intermittent every 24 hours  guaiFENesin  milliGRAM(s) Oral every 12 hours  heparin   Injectable 5000 Unit(s) SubCutaneous every 12 hours  hydrochlorothiazide 25 milliGRAM(s) Oral daily  losartan 100 milliGRAM(s) Oral daily  methylPREDNISolone sodium succinate Injectable 40 milliGRAM(s) IV Push every 8 hours  metoprolol tartrate 75 milliGRAM(s) Oral two times a day  pantoprazole    Tablet 40 milliGRAM(s) Oral before breakfast    MEDICATIONS  (PRN):  albuterol    90 MICROgram(s) HFA Inhaler 2 Puff(s) Inhalation every 3 hours PRN Shortness of Breath and/or Wheezing        I&O's Summary      PHYSICAL EXAM:  Vital Signs Last 24 Hrs  T(C): 36.8 (24 Feb 2024 15:30), Max: 36.8 (23 Feb 2024 23:30)  T(F): 98.2 (24 Feb 2024 15:30), Max: 98.3 (24 Feb 2024 07:44)  HR: 92 (24 Feb 2024 15:30) (61 - 95)  BP: 130/71 (24 Feb 2024 15:30) (108/60 - 160/51)  BP(mean): 90 (24 Feb 2024 05:44) (90 - 90)  RR: 18 (24 Feb 2024 15:30) (18 - 20)  SpO2: 90% (24 Feb 2024 15:30) (87% - 94%)    Parameters below as of 24 Feb 2024 15:30  Patient On (Oxygen Delivery Method): nasal cannula  O2 Flow (L/min): 2          General: Age-appearing, in no acute distress  Head: Normocephalic, atraumatic  ENMT: EOMI, no scleral icterus, neck supple, no JVD  Cardiovascular: +S1, S2; Regular rate and rhythm, no murmurs.  Respiratory: left lung clear, right lung with decreased breath sounds, no wheezing.   Gastrointestinal: soft, ND, NT, (+) BS, (-) rebound  Neuro: AAOx3, CN2-12 intact, no FND  Musculoskeletal: Normal tone, no deformities  Skin: Warm, dry, no rash, no jaundice  Psych: Calm, cooperative     LABS:                        15.3   8.53  )-----------( 131      ( 23 Feb 2024 13:30 )             43.0     02-24    133<L>  |  93<L>  |  27.1<H>  ----------------------------<  190<H>  4.9   |  23.0  |  1.42<H>    Ca    9.9      24 Feb 2024 06:24  Mg     1.8     02-23    TPro  7.2  /  Alb  4.1  /  TBili  0.4  /  DBili  x   /  AST  26  /  ALT  17  /  AlkPhos  77  02-24          Urinalysis Basic - ( 24 Feb 2024 06:24 )    Color: x / Appearance: x / SG: x / pH: x  Gluc: 190 mg/dL / Ketone: x  / Bili: x / Urobili: x   Blood: x / Protein: x / Nitrite: x   Leuk Esterase: x / RBC: x / WBC x   Sq Epi: x / Non Sq Epi: x / Bacteria: x        Culture - Urine (collected 23 Feb 2024 13:30)  Source: Clean Catch Clean Catch (Midstream)  Final Report (24 Feb 2024 13:51):    <10,000 CFU/mL Normal Urogenital Sommer      CAPILLARY BLOOD GLUCOSE            RADIOLOGY & ADDITIONAL TESTS:  Results Reviewed:   Imaging Personally Reviewed:  Electrocardiogram Personally Reviewed:

## 2024-02-25 NOTE — CONSULT NOTE ADULT - SUBJECTIVE AND OBJECTIVE BOX
HPI:  75 yo male, hx of COPD not on home O2, CHF s/p AICD/PPM, HTN, HLD presented to [facility] ED c/o SOB and cough.  Pt reports for 6-8 weeks ago he has been experiencing SOB with an associate cough, pt followed up with his pulmonologist and was started on Azithromycin & Prednisone. He reports his symptoms had mildly improved but after 2-3 weeks still had a persistent cough & SOb. Pt followed up with pulm and was given another round of steroids & Abx. Pt found to have elevated d-dimer by pulm, was sent for CTA which wa snegative for PE but noted right upper bronchus plugging. Pt presented to ED with worsenign SOB at rest and persistent cough. CT chest in ED revealed an endobronchial lesion in the right mainstem. Thoracic surgery consulted for bronch/biopsy.  Pt denies fevers, chills, wt loss, night sweats, chest pain, palpitations, abdominal pain, n/v/d, dysuria, numbness, headaches, dizziness, blurry vision.    PAST MEDICAL & SURGICAL HISTORY:  Chronic systolic congestive heart failure    Benign essential HTN    HLD (hyperlipidemia)    Advanced COPD    H/O removal of cyst    MEDICATIONS  (STANDING):  albuterol/ipratropium for Nebulization 3 milliLiter(s) Nebulizer every 6 hours  atorvastatin 20 milliGRAM(s) Oral at bedtime  azithromycin  IVPB 500 milliGRAM(s) IV Intermittent every 24 hours  guaiFENesin  milliGRAM(s) Oral every 12 hours  heparin   Injectable 5000 Unit(s) SubCutaneous every 12 hours  hydrochlorothiazide 25 milliGRAM(s) Oral daily  losartan 100 milliGRAM(s) Oral daily  methylPREDNISolone sodium succinate Injectable 40 milliGRAM(s) IV Push every 12 hours  metoprolol tartrate 75 milliGRAM(s) Oral two times a day  pantoprazole    Tablet 40 milliGRAM(s) Oral before breakfast    MEDICATIONS  (PRN):  albuterol    90 MICROgram(s) HFA Inhaler 2 Puff(s) Inhalation every 3 hours PRN Shortness of Breath and/or Wheezing      Allergies    No Known Allergies        FAMILY HISTORY:  FH: prostate cancer (Father)    FH: COPD (chronic obstructive pulmonary disease) (Mother)    SOCIAL HISTORY:    Smoking:    Exposure History:    Ambulatory status:    Vital Signs Last 24 Hrs  T(C): 36.9 (25 Feb 2024 11:16), Max: 36.9 (25 Feb 2024 11:16)  T(F): 98.4 (25 Feb 2024 11:16), Max: 98.4 (25 Feb 2024 11:16)  HR: 79 (25 Feb 2024 11:16) (69 - 94)  BP: 136/68 (25 Feb 2024 11:16) (102/57 - 141/67)  BP(mean): --  RR: 18 (25 Feb 2024 11:16) (18 - 19)  SpO2: 92% (25 Feb 2024 11:16) (86% - 96%)    Parameters below as of 25 Feb 2024 11:16  Patient On (Oxygen Delivery Method): nasal cannula  O2 Flow (L/min): 4    Physical Exam  General: WN/WD NAD  Neurology: Awake, nonfocal, LUEVANO x 4  Eyes: Scleras clear, PERRLA/ EOMI, Gross vision intact  ENT:Gross hearing intact, grossly patent pharynx, no stridor  Neck: Neck supple, trachea midline, No JVD,   Respiratory: CTA B/L, No wheezing, rales, rhonchi  CV: RRR, S1S2, no murmurs, rubs or gallops  Abdominal: Soft, NT, ND +BS,   Extremities: No edema, + peripheral pulses  Skin: No Rashes, Hematoma, Ecchymosis  Psych: Oriented x 3, normal affect      LABS:                        13.7   9.04  )-----------( 106      ( 25 Feb 2024 04:00 )             39.1     02-25    134<L>  |  96  |  35.0<H>  ----------------------------<  186<H>  4.8   |  25.0  |  1.63<H>    Ca    9.4      25 Feb 2024 04:00    TPro  7.2  /  Alb  4.1  /  TBili  0.4  /  DBili  x   /  AST  26  /  ALT  17  /  AlkPhos  77  02-24      Urinalysis Basic - ( 25 Feb 2024 04:00 )    Color: x / Appearance: x / SG: x / pH: x  Gluc: 186 mg/dL / Ketone: x  / Bili: x / Urobili: x   Blood: x / Protein: x / Nitrite: x   Leuk Esterase: x / RBC: x / WBC x   Sq Epi: x / Non Sq Epi: x / Bacteria: x        RADIOLOGY & ADDITIONAL STUDIES:      CT chest:    < from: CT Chest No Cont (02.23.24 @ 20:31) >  IMPRESSION: Endobronchial lesion is noted in the right mainstem   bronchus/right upper lobe bronchus. Exact etiology is unclear. Primary   consideration is lung carcinoma.    --- End of Report ---    < end of copied text >       HPI:  75 yo male, hx of COPD not on home O2, CHF s/p AICD/PPM, HTN, HLD presented to [facility] ED c/o SOB and cough.  Pt reports since January he has been experiencing SOB with an associated cough, pt followed up with his pulmonologist and was started on Prednisone & ABx. He reports his symptoms had mildly improved initially but after 2-3 weeks still had a persistent cough & SOB. Pt followed up with pulm and was given another round of steroids & Abx. Pt found to have elevated d-dimer by pulm, was sent for CTA which was negative for PE but noted right upper bronchus plugging. Pt reports over the past week he has noticed that his symptoms had not been improving with persistent shortness of breath doing daily activities and a persistent cough, pt decided to come to the ED for evaluation. CT chest in ED revealed an endobronchial lesion in the right mainstem. Thoracic surgery consulted for bronch/biopsy.  Pt denies fevers, chills, wt loss, night sweats, chest pain, palpitations, abdominal pain, n/v/d, dysuria, numbness, headaches, dizziness, blurry vision.    PAST MEDICAL & SURGICAL HISTORY:  Chronic systolic congestive heart failure    Benign essential HTN    HLD (hyperlipidemia)    Advanced COPD    H/O removal of cyst    MEDICATIONS  (STANDING):  albuterol/ipratropium for Nebulization 3 milliLiter(s) Nebulizer every 6 hours  atorvastatin 20 milliGRAM(s) Oral at bedtime  azithromycin  IVPB 500 milliGRAM(s) IV Intermittent every 24 hours  guaiFENesin  milliGRAM(s) Oral every 12 hours  heparin   Injectable 5000 Unit(s) SubCutaneous every 12 hours  hydrochlorothiazide 25 milliGRAM(s) Oral daily  losartan 100 milliGRAM(s) Oral daily  methylPREDNISolone sodium succinate Injectable 40 milliGRAM(s) IV Push every 12 hours  metoprolol tartrate 75 milliGRAM(s) Oral two times a day  pantoprazole    Tablet 40 milliGRAM(s) Oral before breakfast    MEDICATIONS  (PRN):  albuterol    90 MICROgram(s) HFA Inhaler 2 Puff(s) Inhalation every 3 hours PRN Shortness of Breath and/or Wheezing      Allergies    No Known Allergies        FAMILY HISTORY:  FH: prostate cancer (Father)    FH: COPD (chronic obstructive pulmonary disease) (Mother)    SOCIAL HISTORY:    Smoking: former smoker, quit 15 years ago, 50 pack years  Denies ETOH, ilicit drug use   Ambulatory status: ambulates independtly     Vital Signs Last 24 Hrs  T(C): 36.9 (25 Feb 2024 11:16), Max: 36.9 (25 Feb 2024 11:16)  T(F): 98.4 (25 Feb 2024 11:16), Max: 98.4 (25 Feb 2024 11:16)  HR: 79 (25 Feb 2024 11:16) (69 - 94)  BP: 136/68 (25 Feb 2024 11:16) (102/57 - 141/67)  BP(mean): --  RR: 18 (25 Feb 2024 11:16) (18 - 19)  SpO2: 92% (25 Feb 2024 11:16) (86% - 96%)    Parameters below as of 25 Feb 2024 11:16  Patient On (Oxygen Delivery Method): nasal cannula  O2 Flow (L/min): 4    Physical Exam  General: NAD  Neurology: Awake, nonfocal, LUEVANO x 4  ENT: Gross hearing intact, grossly patent pharynx, no stridor  Neck: Neck supple, trachea midline, No JVD   Respiratory: diminished B/L, No wheezing, rales, rhonchi, + cough  CV: RRR, S1S2, no murmurs, rubs or gallops  Abdominal: Soft, NT, ND +BS   Extremities: No edema, + peripheral pulses  Skin: No Rashes, Hematoma, Ecchymosis  Psych: Oriented x 3, normal affect      LABS:                        13.7   9.04  )-----------( 106      ( 25 Feb 2024 04:00 )             39.1     02-25    134<L>  |  96  |  35.0<H>  ----------------------------<  186<H>  4.8   |  25.0  |  1.63<H>    Ca    9.4      25 Feb 2024 04:00    TPro  7.2  /  Alb  4.1  /  TBili  0.4  /  DBili  x   /  AST  26  /  ALT  17  /  AlkPhos  77  02-24      Urinalysis Basic - ( 25 Feb 2024 04:00 )    Color: x / Appearance: x / SG: x / pH: x  Gluc: 186 mg/dL / Ketone: x  / Bili: x / Urobili: x   Blood: x / Protein: x / Nitrite: x   Leuk Esterase: x / RBC: x / WBC x   Sq Epi: x / Non Sq Epi: x / Bacteria: x        RADIOLOGY & ADDITIONAL STUDIES:      CT chest:    < from: CT Chest No Cont (02.23.24 @ 20:31) >  IMPRESSION: Endobronchial lesion is noted in the right mainstem   bronchus/right upper lobe bronchus. Exact etiology is unclear. Primary   consideration is lung carcinoma.    --- End of Report ---    < end of copied text >

## 2024-02-25 NOTE — PROGRESS NOTE ADULT - ASSESSMENT
73 yo male, hx of COPD not on home o2, CHF s/p AICD/PPM, HTN, HLD presenting for worsening dyspnea with associated cough, found to be hypoxic concerning for COPD exacerbation for patient was admitted, incidentally found to have chest ct findings concerning for possible lung cancer for which patient also to be further worked up.    #Acute hypoxic respiratory failure due to COPD exacerbation and mucous plugging  not on home o2 at baseline, currently saturating 90% on 4L via NC.   outpatient pulmonary records reviewed in allscripts from jan and feb 2024  CT chest and CXR imaging reviewed.   s/p duonebs, solumedrol, mag in ED  -c/w solumedrol, taper to 40mg q12 today, will taper tomorrow  -c/w duonebs with prn albuterol mdi  -c/w zithromax 500mg IV daily   -ambulatory O2 sat; home O2 if < 88%  -Pulm consulted, recs noted  -CTS consulted for possible bronch  -c/w Mucinex q12  -c/w Chest PT, incentive spirometry    #Endobronchial lesion  noted in the right mainstem bronchus/right upper lobe bronchus  CT Chest findings reviewed with patient  upon review of CTA Chest ordered earlier this month as well as CT Low dose lung cancer screening, there was noted mucous deposition, plugging noted in right upper lobe/bronchus   concern for possible malignancy  -Pulm consulted, recs noted  -CTS consulted, will follow recs    #HTN  c/w home losartan and hctz and beta blocker    #HLD   c/w home statin     VTE PPX: heparin subq bid  GI PPX: on protonix while on steroids     Dispo: pending pulm eval, improvement in spo2    Plan of care discussed with patient, RN at bedside, and Dr. Olguin.

## 2024-02-25 NOTE — CONSULT NOTE ADULT - ASSESSMENT
75 yo male, hx of COPD not on home O2, CHF s/p AICD/PPM, HTN, HLD presented to [facility] ED c/o SOB and cough.  Pt reports for 6-8 weeks ago he has been experiencing SOB with an associate cough, pt followed up with his pulmonologist and was started on Azithromycin & Prednisone. He reports his symptoms had mildly improved but after 2-3 weeks still had a persistent cough & SOb. Pt followed up with pulm and was given another round of steroids & Abx. Pt found to have elevated d-dimer by pulm, was sent for CTA which wa snegative for PE but noted right upper bronchus plugging. Pt presented to ED with worsenign SOB at rest and persistent cough. CT chest in ED revealed an endobronchial lesion in the right mainstem. Thoracic surgery consulted for bronch/biopsy. 73 yo male, hx of COPD not on home O2, CHF s/p AICD/PPM, HTN, HLD presented to [facility] ED c/o SOB and cough.  Pt reports since January he has been experiencing SOB with an associated cough, pt followed up with his pulmonologist and was started on Prednisone & ABx. He reports his symptoms had mildly improved initially but after 2-3 weeks still had a persistent cough & SOB. Pt followed up with pulm and was given another round of steroids & Abx. Pt found to have elevated d-dimer by pulm, was sent for CTA which was negative for PE but noted right upper bronchus plugging. Pt reports over the past week he has noticed that his symptoms had not been improving with persistent shortness of breath doing daily activities and a persistent cough, pt decided to come to the ED for evaluation. CT chest in ED revealed an endobronchial lesion in the right mainstem. Thoracic surgery consulted for bronch/biopsy.

## 2024-02-25 NOTE — DISCHARGE NOTE NURSING/CASE MANAGEMENT/SOCIAL WORK - NSDCFUADDAPPT_GEN_ALL_CORE_FT
PT DECLINED [facility] TO BEDS  PT AGREEABLE WITH [facility]  PT AGREEABLE TO OUTPT F/U APPTS  PLEASE GIVE YELLOW FOLDER TO PT. Pulm--Appointment made with  Dr. Reyes on   4/19    at 10:45  .If you are unable to attend your pre-scheduled appointment, please contact the office directly at [phone] to [facility].      Pcp- Just spoke with [place] and was informed [facility] is not considered to be his pcp-     PT DECLINED [facility] TO BEDS  PT AGREEABLE WITH [facility]  PT AGREEABLE TO OUTPT F/U APPTS  PLEASE GIVE YELLOW FOLDER TO PT.

## 2024-02-25 NOTE — CONSULT NOTE ADULT - ASSESSMENT
Assess    AECOPD  Acute respiratory failure with hypoxia  Mucous plugging and possible endobronchial obstruction by mass lesion    Plan    DuoNeb  Steroid  CTS evaluation for bronchoscopy  Percussion for sputum mobilization  Azithromycin  Will Follow

## 2024-02-26 PROBLEM — E78.5 HYPERLIPIDEMIA, UNSPECIFIED: Chronic | Status: ACTIVE | Noted: 2024-01-01

## 2024-02-26 PROBLEM — J44.9 CHRONIC OBSTRUCTIVE PULMONARY DISEASE, UNSPECIFIED: Chronic | Status: ACTIVE | Noted: 2024-01-01

## 2024-02-26 PROBLEM — I10 ESSENTIAL (PRIMARY) HYPERTENSION: Chronic | Status: ACTIVE | Noted: 2024-01-01

## 2024-02-26 NOTE — PROGRESS NOTE ADULT - ASSESSMENT
73 yo male, hx of COPD not on home O2, CHF s/p AICD/PPM, HTN, HLD presented to [facility] ED c/o SOB and cough.  Pt reports since January he has been experiencing SOB with an associated cough, pt followed up with his pulmonologist and was started on Prednisone & ABx. He reports his symptoms had mildly improved initially but after 2-3 weeks still had a persistent cough & SOB. Pt followed up with pulm and was given another round of steroids & Abx. Pt found to have elevated d-dimer by pulm, was sent for CTA which was negative for PE but noted right upper bronchus plugging. Pt reports over the past week he has noticed that his symptoms had not been improving with persistent shortness of breath doing daily activities and a persistent cough, pt decided to come to the ED for evaluation. CT chest in ED revealed an endobronchial lesion in the right mainstem. Thoracic surgery consulted for bronch/biopsy.

## 2024-02-26 NOTE — PROGRESS NOTE ADULT - SUBJECTIVE AND OBJECTIVE BOX
Subjective - patient seen and evaluated bedside. Sitting comfortably in bed. Admits to SOB, not improved. Denies CP, HA, dizziness, n/v/d    Review of Systems: negative x 10 systems except as noted above    Brief summary:  74yMale p/w SOB, found to have endobronchial lesion, pending bronchoscopy    Significant/[id number] events: no acute events      PAST MEDICAL & SURGICAL HISTORY:  Chronic systolic congestive heart failure      Benign essential HTN      HLD (hyperlipidemia)      Advanced COPD      H/O removal of cyst            albuterol    90 MICROgram(s) HFA Inhaler 2 Puff(s) Inhalation every 3 hours PRN  albuterol/ipratropium for Nebulization 3 milliLiter(s) Nebulizer every 6 hours  atorvastatin 20 milliGRAM(s) Oral at bedtime  azithromycin  IVPB 500 milliGRAM(s) IV Intermittent every 24 hours  guaiFENesin  milliGRAM(s) Oral every 12 hours  heparin   Injectable 5000 Unit(s) SubCutaneous every 12 hours  hydrochlorothiazide 25 milliGRAM(s) Oral daily  losartan 100 milliGRAM(s) Oral daily  methylPREDNISolone sodium succinate Injectable 40 milliGRAM(s) IV Push every 12 hours  metoprolol tartrate 75 milliGRAM(s) Oral two times a day  pantoprazole    Tablet 40 milliGRAM(s) Oral before breakfast  MEDICATIONS  (PRN):  albuterol    90 MICROgram(s) HFA Inhaler 2 Puff(s) Inhalation every 3 hours PRN Shortness of Breath and/or Wheezing      Daily     Daily                               12.9   11.36 )-----------( 100      ( 26 Feb 2024 03:06 )             36.4   02-26    131<L>  |  96  |  40.1<H>  ----------------------------<  204<H>  5.4<H>   |  20.0<L>  |  1.49<H>    Ca    9.1      26 Feb 2024 03:06  Phos  3.5     02-26              Objective:  T(C): 37.1 (02-26-24 @ 11:10), Max: 37.1 (02-25-24 @ 16:07)  HR: 73 (02-26-24 @ 11:10) (60 - 99)  BP: 128/74 (02-26-24 @ 11:10) (116/62 - 137/84)  RR: 18 (02-26-24 @ 11:10) (18 - 18)  SpO2: 93% (02-26-24 @ 11:10) (90% - 95%)  Wt(kg): --CAPILLARY BLOOD GLUCOSE      I&O's Summary    25 Feb 2024 07:01  -  26 Feb 2024 07:00  --------------------------------------------------------  IN: 150 mL / OUT: 1300 mL / NET: -1150 mL    26 Feb 2024 07:01  -  26 Feb 2024 13:33  --------------------------------------------------------  IN: 0 mL / OUT: 500 mL / NET: -500 mL        Physical Exam  General: NAD  Neuro: A+O x 3, non-focal, speech clear and intact  Psych: Appropriate affect  Pulm: CTA, equal bilaterally  CV: RRR, +S1S2  Abd: soft, NT, ND, +BS  Ext: +DP Pulses b/l, no edema  Skin: Warm, dry, intact          Imaging:      < from: CT Chest No Cont (02.23.24 @ 20:31) >  No hilar or mediastinal adenopathy is noted.    Heart is normal in size. An AICD is noted in place. No pericardial   effusion is noted.    Once again, an endobronchial lesion is noted in the right mainstem   bronchus/right upper lobe bronchus. Mucous/secretions are noted within   the right middle and right lower lobe bronchi. Resultant partial   atelectasis of the right upper and right middle lobes is noted. In   addition, tree-in-bud opacities likely representing mucoid impacted   airways are noted in the right upper lobe. Few less than 0.5 cm solid   nodules are noted in both lungs. No pleural effusions are noted.    Below the diaphragm, visualized portions of the abdomen demonstrate   thickening of the left adrenal gland. Small low-attenuation lesions in   both kidneys are too small to be adequately characterized on this exam.    Degenerative changes of the spine are noted.    IMPRESSION: Endobronchial lesion is noted in the right mainstem   bronchus/right upper lobe bronchus. Exact etiology is unclear. Primary   consideration is lung carcinoma.    < end of copied text >

## 2024-02-26 NOTE — CONSULT NOTE ADULT - SUBJECTIVE AND OBJECTIVE BOX
Formerly [facility]                                   [address] [place] [address]                                                      PHONE: [phone]                                                         FAX: [phone]  [email or web address]  ---------------------------------------------------------------------------------------------------------------------------------    HPI:  CANDACE RAPHAEL is an 74y Male with a hx of heart failure with improved EF, ICD, PPM, VT/VF, hld, htn p/w cough and PATEL.  Patient found to have Bronchial mucus plugging and an endobronchial lesion.  Pt will require bronchoscopy.   He reports that he cannot walk up a flight of stairs without stopping.  He has no chest pain, hx of ischemic heart disease, hx of stroke or diabetes.     PAST MEDICAL & SURGICAL HISTORY:  Chronic systolic congestive heart failure      Benign essential HTN      HLD (hyperlipidemia)      Advanced COPD      H/O removal of cyst          No Known Allergies      MEDICATIONS  (STANDING):  albuterol/ipratropium for Nebulization 3 milliLiter(s) Nebulizer every 6 hours  atorvastatin 20 milliGRAM(s) Oral at bedtime  azithromycin  IVPB 500 milliGRAM(s) IV Intermittent every 24 hours  guaiFENesin  milliGRAM(s) Oral every 12 hours  heparin   Injectable 5000 Unit(s) SubCutaneous every 12 hours  hydrochlorothiazide 25 milliGRAM(s) Oral daily  losartan 100 milliGRAM(s) Oral daily  methylPREDNISolone sodium succinate Injectable 40 milliGRAM(s) IV Push every 12 hours  metoprolol tartrate 75 milliGRAM(s) Oral two times a day  pantoprazole    Tablet 40 milliGRAM(s) Oral before breakfast    MEDICATIONS  (PRN):  albuterol    90 MICROgram(s) HFA Inhaler 2 Puff(s) Inhalation every 3 hours PRN Shortness of Breath and/or Wheezing      Family History: Pt denies hx of early cad, SCD, or congenital heart disease.      Social History:  Cigarettes:         quit 15 years ago           Alchohol:   no              Illicit Drug Abuse:  no    ROS:    Extensively Reviewed with pertinents as per HPI the remainder were negative.      Vital Signs Last 24 Hrs  T(C): 36.6 (26 Feb 2024 07:22), Max: 37.1 (25 Feb 2024 16:07)  T(F): 97.8 (26 Feb 2024 07:22), Max: 98.8 (25 Feb 2024 16:07)  HR: 60 (26 Feb 2024 07:22) (60 - 99)  BP: 130/76 (26 Feb 2024 07:22) (116/62 - 137/84)  BP(mean): --  RR: 18 (26 Feb 2024 07:22) (18 - 18)  SpO2: 95% (26 Feb 2024 07:22) (90% - 95%)    Parameters below as of 26 Feb 2024 07:22  Patient On (Oxygen Delivery Method): nasal cannula  O2 Flow (L/min): 3    ICU Vital Signs Last 24 Hrs  CANDACE RAPHAEL  I&O's Detail    25 Feb 2024 07:01  -  26 Feb 2024 07:00  --------------------------------------------------------  IN:    Oral Fluid: 150 mL  Total IN: 150 mL    OUT:    Voided (mL): 1300 mL  Total OUT: 1300 mL    Total NET: -1150 mL      26 Feb 2024 07:01  -  26 Feb 2024 10:27  --------------------------------------------------------  IN:  Total IN: 0 mL    OUT:    Voided (mL): 500 mL  Total OUT: 500 mL    Total NET: -500 mL        I&O's Summary    25 Feb 2024 07:01  -  26 Feb 2024 07:00  --------------------------------------------------------  IN: 150 mL / OUT: 1300 mL / NET: -1150 mL    26 Feb 2024 07:01  -  26 Feb 2024 10:27  --------------------------------------------------------  IN: 0 mL / OUT: 500 mL / NET: -500 mL      Drug Dosing Weight  CANDACE RAPHAEL      PHYSICAL EXAM:  General: Appears well developed, alert and cooperative.  HEENT: Head; normocephalic, atraumatic.  Eyes: Pupils reactive, cornea wnl.  Neck: Supple, no nodes adenopathy, no JVD, no carotid bruit  CARDIOVASCULAR: Normal S1 and S2, No murmur, rub, gallop or lift.   LUNGS: No rales, poor air movement  ABDOMEN: Soft, nontender, nondistended  EXTREMITIES: No clubbing or edema. Distal pulses wnl.   SKIN: warm and dry with normal turgor.  NEURO: Alert/oriented x 3/normal motor exam.   PSYCH: normal affect.        LABS:                        12.9   11.36 )-----------( 100      ( 26 Feb 2024 03:06 )             36.4     02-26    131<L>  |  96  |  40.1<H>  ----------------------------<  204<H>  5.4<H>   |  20.0<L>  |  1.49<H>    Ca    9.1      26 Feb 2024 03:06  Phos  3.5     02-26      CANDACE RAPHAEL        Urinalysis Basic - ( 26 Feb 2024 03:06 )    Color: x / Appearance: x / SG: x / pH: x  Gluc: 204 mg/dL / Ketone: x  / Bili: x / Urobili: x   Blood: x / Protein: x / Nitrite: x   Leuk Esterase: x / RBC: x / WBC x   Sq Epi: x / Non Sq Epi: x / Bacteria: x        RADIOLOGY & ADDITIONAL STUDIES: CT scan personally reviewed- normal cardiac silhouette, dual chamber ICD    INTERPRETATION OF TELEMETRY (personally reviewed):    ECG: NSR, RV paced    ECHO: from 12/4/23 in our office: LVEF 65%, no significant valvular pathology.     STRESS TEST:    CARDIAC CATHETERIZATION:    Assessment and Plan:  In summary, CANDACE RAPHAEL is an 74y Male with a hx of heart failure with improved EF, ICD, PPM, VT/VF, hld, htn p/w cough and PATEL found to have endobronchial lesions.    Preoperative risk assessment- patient cannot perform 4 mets.  He is a moderate risk for a low risk procedure such as bronchoscopy.  Would proceed without further cardiovascular workup as it will not change our management.  there are no active cardiac contraindications.    -pt has COPD; pulmonology consulting    Please call us back with any further questions        Thank you for allowing [facility] to participate in the care of this patient.  Please feel free to call with any questions or concerns.

## 2024-02-26 NOTE — PROGRESS NOTE ADULT - ASSESSMENT
75 yo male, hx of COPD not on home o2, CHF s/p AICD/PPM, HTN, HLD presenting for worsening dyspnea with associated cough, found to be hypoxic concerning for COPD exacerbation for patient was admitted, incidentally found to have chest ct findings concerning for possible lung cancer for which patient also to be further worked up.    #Acute hypoxic respiratory failure due to COPD exacerbation and mucous plugging  not on home o2 at baseline, currently saturating 90% on 4L via NC.   outpatient pulmonary records reviewed in allscripts from jan and feb 2024  CT chest and CXR imaging reviewed.   s/p duonebs, solumedrol, mag in ED  -c/w solumedrol, taper to 40mg q12 today, will taper tomorrow  -c/w duonebs with prn albuterol mdi  -c/w zithromax 500mg IV daily   -ambulatory O2 sat; home O2 if < 88%  -Pulm consulted, recs noted  -CTS consulted for possible bronch  -c/w Mucinex q12  -c/w Chest PT, incentive spirometry    #Endobronchial lesion  noted in the right mainstem bronchus/right upper lobe bronchus  CT Chest findings reviewed with patient  upon review of CTA Chest ordered earlier this month as well as CT Low dose lung cancer screening, there was noted mucous deposition, plugging noted in right upper lobe/bronchus   concern for possible malignancy  -Pulm consulted, recs noted  -CTS consulted, planning bronchoscopy 2/28.    #HTN  c/w home losartan and hctz and beta blocker    #HLD   c/w home statin     VTE PPX: heparin subq bid  GI PPX: on protonix while on steroids

## 2024-02-27 NOTE — PROGRESS NOTE ADULT - SUBJECTIVE AND OBJECTIVE BOX
Patient: CANDACE RAPHAEL [id number] 74y Male                            Hospitalist Attending Note    Wife at bedside.  Denies complaints.  No SOB.   No cough / pain.     ____________________PHYSICAL EXAM:  GENERAL:  NAD Alert and Oriented x 3   HEENT: NCAT  CARDIOVASCULAR:  S1, S2  LUNGS: CTAB  ABDOMEN:  soft, (-) tenderness, (-) distension, (+) bowel sounds, (-) guarding, (-) rebound (-) rigidity  EXTREMITIES:  no cyanosis / clubbing / edema.   ____________________    VITALS:  Vital Signs Last 24 Hrs  T(C): 36.6 (27 Feb 2024 16:16), Max: 36.6 (26 Feb 2024 19:14)  T(F): 97.9 (27 Feb 2024 16:16), Max: 97.9 (27 Feb 2024 09:25)  HR: 92 (27 Feb 2024 16:16) (69 - 92)  BP: 128/83 (27 Feb 2024 16:16) (113/64 - 155/84)  BP(mean): 98 (27 Feb 2024 16:16) (98 - 98)  RR: 20 (27 Feb 2024 16:16) (18 - 20)  SpO2: 93% (27 Feb 2024 16:16) (91% - 95%)    Parameters below as of 27 Feb 2024 16:16  Patient On (Oxygen Delivery Method): nasal cannula  O2 Flow (L/min): 2   Daily     Daily   CAPILLARY BLOOD GLUCOSE        I&O's Summary    26 Feb 2024 07:01  -  27 Feb 2024 07:00  --------------------------------------------------------  IN: 1210 mL / OUT: 1450 mL / NET: -240 mL    27 Feb 2024 07:01  -  27 Feb 2024 17:43  --------------------------------------------------------  IN: 480 mL / OUT: 500 mL / NET: -20 mL        LABS:                        14.2   10.97 )-----------( 109      ( 27 Feb 2024 05:47 )             40.3     02-27    133<L>  |  97  |  42.2<H>  ----------------------------<  177<H>  4.8   |  24.0  |  1.68<H>    Ca    9.3      27 Feb 2024 05:47  Phos  3.5     02-26          Urinalysis Basic - ( 27 Feb 2024 05:47 )    Color: x / Appearance: x / SG: x / pH: x  Gluc: 177 mg/dL / Ketone: x  / Bili: x / Urobili: x   Blood: x / Protein: x / Nitrite: x   Leuk Esterase: x / RBC: x / WBC x   Sq Epi: x / Non Sq Epi: x / Bacteria: x              MEDICATIONS:  albuterol    90 MICROgram(s) HFA Inhaler 2 Puff(s) Inhalation every 3 hours PRN  albuterol/ipratropium for Nebulization 3 milliLiter(s) Nebulizer every 6 hours  atorvastatin 20 milliGRAM(s) Oral at bedtime  azithromycin  IVPB 500 milliGRAM(s) IV Intermittent every 24 hours  guaiFENesin  milliGRAM(s) Oral every 12 hours  heparin   Injectable 5000 Unit(s) SubCutaneous every 12 hours  hydrochlorothiazide 25 milliGRAM(s) Oral daily  losartan 100 milliGRAM(s) Oral daily  methylPREDNISolone sodium succinate Injectable 40 milliGRAM(s) IV Push every 12 hours  metoprolol tartrate 75 milliGRAM(s) Oral two times a day  pantoprazole    Tablet 40 milliGRAM(s) Oral before breakfast

## 2024-02-27 NOTE — PROGRESS NOTE ADULT - ASSESSMENT
73 yo male, hx of COPD not on home O2, CHF s/p AICD/PPM, HTN, HLD presented to [facility] ED c/o SOB and cough.  Pt reports since January he has been experiencing SOB with an associated cough, pt followed up with his pulmonologist and was started on Prednisone & ABx. He reports his symptoms had mildly improved initially but after 2-3 weeks still had a persistent cough & SOB. Pt followed up with pulm and was given another round of steroids & Abx. Pt found to have elevated d-dimer by pulm, was sent for CTA which was negative for PE but noted right upper bronchus plugging. Pt reports over the past week he has noticed that his symptoms had not been improving with persistent shortness of breath doing daily activities and a persistent cough, pt decided to come to the ED for evaluation. CT chest in ED revealed an endobronchial lesion in the right mainstem. Thoracic surgery consulted for bronch/biopsy.

## 2024-02-27 NOTE — PROGRESS NOTE ADULT - SUBJECTIVE AND OBJECTIVE BOX
Subjective - patient seen and evaluated bedside. Sitting comfortably in bed. Denies CP, SOB, HA, dizziness, n/v/d    Review of Systems: negative x 10 systems except as noted above    Brief summary:  74yMale w/ resp failure, endobronchial lesion, pending bronchoscopy    Significant/[id number] events: no acute events      PAST MEDICAL & SURGICAL HISTORY:  Chronic systolic congestive heart failure      Benign essential HTN      HLD (hyperlipidemia)      Advanced COPD      H/O removal of cyst            albuterol    90 MICROgram(s) HFA Inhaler 2 Puff(s) Inhalation every 3 hours PRN  albuterol/ipratropium for Nebulization 3 milliLiter(s) Nebulizer every 6 hours  atorvastatin 20 milliGRAM(s) Oral at bedtime  azithromycin  IVPB 500 milliGRAM(s) IV Intermittent every 24 hours  guaiFENesin  milliGRAM(s) Oral every 12 hours  heparin   Injectable 5000 Unit(s) SubCutaneous every 12 hours  hydrochlorothiazide 25 milliGRAM(s) Oral daily  losartan 100 milliGRAM(s) Oral daily  methylPREDNISolone sodium succinate Injectable 40 milliGRAM(s) IV Push every 12 hours  metoprolol tartrate 75 milliGRAM(s) Oral two times a day  pantoprazole    Tablet 40 milliGRAM(s) Oral before breakfast  MEDICATIONS  (PRN):  albuterol    90 MICROgram(s) HFA Inhaler 2 Puff(s) Inhalation every 3 hours PRN Shortness of Breath and/or Wheezing      Daily     Daily                               14.2   10.97 )-----------( 109      ( 27 Feb 2024 05:47 )             40.3   02-27    133<L>  |  97  |  42.2<H>  ----------------------------<  177<H>  4.8   |  24.0  |  1.68<H>    Ca    9.3      27 Feb 2024 05:47  Phos  3.5     02-26              Objective:  T(C): 36.3 (02-27-24 @ 05:08), Max: 37.1 (02-26-24 @ 11:10)  HR: 75 (02-27-24 @ 08:16) (69 - 95)  BP: 113/64 (02-27-24 @ 05:08) (113/64 - 155/84)  RR: 18 (02-27-24 @ 05:08) (18 - 18)  SpO2: 95% (02-27-24 @ 08:16) (92% - 95%)  Wt(kg): --CAPILLARY BLOOD GLUCOSE      I&O's Summary    26 Feb 2024 07:01  -  27 Feb 2024 07:00  --------------------------------------------------------  IN: 1210 mL / OUT: 1450 mL / NET: -240 mL        Physical Exam  General: NAD  Neuro: A+O x 3, non-focal, speech clear and intacth.  Pulm: CTA, equal bilaterally  CV: RRR, +S1S2  Abd: soft, NT, ND, +BS  Ext: +DP Pulses b/l, no edema  Skin: Warm, dry, intact      Imaging:    < from: CT Chest No Cont (02.23.24 @ 20:31) >  INTERPRETATION:  Clinical information: Shortness of breath. Exam is   compared to previous studies of 2/2/2024 as well as 10/26/2023.    CT scan of thechest was obtained without administration of intravenous   contrast.    Small low-attenuation lesion is noted in the right lobe of the thyroid   gland.    No hilar or mediastinal adenopathy is noted.    Heart is normal in size. An AICD is noted in place. No pericardial   effusion is noted.    Once again, an endobronchial lesion is noted in the right mainstem   bronchus/right upper lobe bronchus. Mucous/secretions are noted within   the right middle and right lower lobe bronchi. Resultant partial   atelectasis of the right upper and right middle lobes is noted. In   addition, tree-in-bud opacities likely representing mucoid impacted   airways are noted in the right upper lobe. Few less than 0.5 cm solid   nodules are noted in both lungs. No pleural effusions are noted.    Below the diaphragm, visualized portions of the abdomen demonstrate   thickening of the left adrenal gland. Small low-attenuation lesions in   both kidneys are too small to be adequately characterized on this exam.    Degenerative changes of the spine are noted.    IMPRESSION: Endobronchial lesion is noted in the right mainstem   bronchus/right upper lobe bronchus. Exact etiology is unclear. Primary   consideration is lung carcinoma.    < end of copied text >

## 2024-02-27 NOTE — PROGRESS NOTE ADULT - ASSESSMENT
75 yo male, hx of COPD not on home o2, CHF s/p AICD/PPM, HTN, HLD presenting for worsening dyspnea with associated cough, found to be hypoxic concerning for COPD exacerbation for patient was admitted, incidentally found to have chest ct findings concerning for possible lung cancer for which patient also to be further worked up.    #Acute hypoxic respiratory failure due to COPD exacerbation and mucous plugging  not on home o2 at baseline, currently saturating 90% on 4L via NC.   outpatient pulmonary records reviewed in allscripts from jan and feb 2024  CT chest and CXR imaging reviewed.   s/p duonebs, solumedrol, mag in ED  -c/w solumedrol, taper to 40mg q12 today, will taper tomorrow  -c/w duonebs with prn albuterol mdi  -c/w zithromax 500mg IV daily   -ambulatory O2 sat; home O2 if < 88%  -Pulm consulted, recs noted  -CTS planning bronchoscopy 2/28  -c/w Mucinex q12  -c/w Chest PT, incentive spirometry    #Endobronchial lesion  noted in the right mainstem bronchus/right upper lobe bronchus  CT Chest findings reviewed with patient  upon review of CTA Chest ordered earlier this month as well as CT Low dose lung cancer screening, there was noted mucous deposition, plugging noted in right upper lobe/bronchus   concern for possible malignancy  -Pulm consulted, recs noted  -CTS consulted, planning bronchoscopy 2/28.    #HTN  c/w home losartan and hctz and beta blocker    #HLD   c/w home statin     VTE PPX: heparin subq bid  GI PPX: on protonix while on steroids

## 2024-02-28 NOTE — PROGRESS NOTE ADULT - SUBJECTIVE AND OBJECTIVE BOX
Patient is a 74y old  Male who presents with a chief complaint of COPD exacerbation, (27 Feb 2024 17:42)    INTERVAL HPI/OVERNIGHT EVENTS: No acute events overnight. HD stable.     MEDICATIONS  (STANDING):  albuterol/ipratropium for Nebulization 3 milliLiter(s) Nebulizer every 6 hours  atorvastatin 20 milliGRAM(s) Oral at bedtime  azithromycin  IVPB 500 milliGRAM(s) IV Intermittent every 24 hours  guaiFENesin  milliGRAM(s) Oral every 12 hours  hydrochlorothiazide 25 milliGRAM(s) Oral daily  losartan 100 milliGRAM(s) Oral daily  methylPREDNISolone sodium succinate Injectable 40 milliGRAM(s) IV Push every 12 hours  metoprolol tartrate 75 milliGRAM(s) Oral two times a day  pantoprazole    Tablet 40 milliGRAM(s) Oral before breakfast    MEDICATIONS  (PRN):  albuterol    90 MICROgram(s) HFA Inhaler 2 Puff(s) Inhalation every 3 hours PRN Shortness of Breath and/or Wheezing      Allergies    No Known Allergies    Intolerances        REVIEW OF SYSTEMS: all negative with exception of above    Vital Signs Last 24 Hrs  T(C): 37 (28 Feb 2024 08:01), Max: 37 (28 Feb 2024 08:01)  T(F): 98.6 (28 Feb 2024 08:01), Max: 98.6 (28 Feb 2024 08:01)  HR: 60 (28 Feb 2024 08:41) (60 - 97)  BP: 146/60 (28 Feb 2024 08:01) (115/65 - 153/83)  BP(mean): 98 (27 Feb 2024 16:16) (98 - 98)  RR: 18 (28 Feb 2024 08:01) (18 - 20)  SpO2: 93% (28 Feb 2024 08:41) (93% - 96%)    Parameters below as of 28 Feb 2024 08:41  Patient On (Oxygen Delivery Method): nasal cannula, 2L        PHYSICAL EXAM:  GENERAL:  NAD Alert and Oriented x 3   HEENT: NCAT  CARDIOVASCULAR:  S1, S2  LUNGS: CTAB  ABDOMEN:  soft, (-) tenderness, (-) distension, (+) bowel sounds, (-) guarding, (-) rebound (-) rigidity  EXTREMITIES:  no cyanosis / clubbing / edema.   LABS:                        14.5   10.81 )-----------( 110      ( 28 Feb 2024 06:11 )             40.7     02-28    133<L>  |  96  |  37.5<H>  ----------------------------<  210<H>  4.3   |  24.0  |  1.54<H>    Ca    9.3      28 Feb 2024 06:11      PT/INR - ( 28 Feb 2024 06:11 )   PT: 11.1 sec;   INR: 1.00 ratio         PTT - ( 28 Feb 2024 06:11 )  PTT:23.6 sec  Urinalysis Basic - ( 28 Feb 2024 06:11 )    Color: x / Appearance: x / SG: x / pH: x  Gluc: 210 mg/dL / Ketone: x  / Bili: x / Urobili: x   Blood: x / Protein: x / Nitrite: x   Leuk Esterase: x / RBC: x / WBC x   Sq Epi: x / Non Sq Epi: x / Bacteria: x      CAPILLARY BLOOD GLUCOSE          RADIOLOGY & ADDITIONAL TESTS:    Imaging Personally Reviewed:  [ ] YES  [ ] NO    Consultant(s) Notes Reviewed:  [ ] YES  [ ] NO    Care Discussed with Consultants/Other Providers [ ] YES  [ ] NO

## 2024-02-28 NOTE — BRIEF OPERATIVE NOTE - COMMENTS
No qualified resident was available to assist in this case. I have personally first assisted the Cardiothoracic Surgeon listed on this brief op note throughout the entirety of this case.

## 2024-02-28 NOTE — PROGRESS NOTE ADULT - ASSESSMENT
75 yo male, hx of COPD not on home o2, CHF s/p AICD/PPM, HTN, HLD presenting for worsening dyspnea with associated cough, found to be hypoxic concerning for COPD exacerbation for patient was admitted, incidentally found to have chest ct findings concerning for possible lung cancer for which patient also to be further worked up.    #Acute hypoxic respiratory failure due to COPD exacerbation and mucous plugging  not on home o2 at baseline, currently saturating 90% on 4L via NC.   outpatient pulmonary records reviewed in allscripts from jan and feb 2024  CT chest and CXR imaging reviewed.   s/p duonebs, solumedrol, mag in ED  -transitioned to PO prednisone  - Heme onc c/s placed  -c/w duonebs with prn albuterol mdi  -c/w zithromax 500mg IV daily   -ambulatory O2 sat; home O2 if < 88%  -Pulm consulted, recs noted  -CTS planning bronchoscopy 2/28  -c/w Mucinex q12  -c/w Chest PT, incentive spirometry    #Endobronchial lesion  noted in the right mainstem bronchus/right upper lobe bronchus  CT Chest findings reviewed with patient  upon review of CTA Chest ordered earlier this month as well as CT Low dose lung cancer screening, there was noted mucous deposition, plugging noted in right upper lobe/bronchus   concern for possible malignancy  -Pulm consulted, recs noted  -CTS consulted, planning bronchoscopy today    #HTN  c/w home losartan and hctz and beta blocker    #HLD   c/w home statin     VTE PPX: heparin subq bid  GI PPX: on protonix while on steroids

## 2024-02-28 NOTE — BRIEF OPERATIVE NOTE - NSICDXBRIEFPROCEDURE_GEN_ALL_CORE_FT
PROCEDURES:  Bronchoscopy, with BAL 28-Feb-2024 14:02:07 R mainstem bronchial brushing, BAL Tamiko Arredondo

## 2024-02-29 NOTE — DISCHARGE NOTE PROVIDER - ATTENDING DISCHARGE PHYSICAL EXAMINATION:
Vital Signs Last 24 Hrs  T(F): 98.1 (29 Feb 2024 08:00), Max: 99.1 (29 Feb 2024 05:00)  HR: 70 (29 Feb 2024 08:39) (68 - 80)  BP: 128/74 (29 Feb 2024 08:00) (123/71 - 129/75)  RR: 18 (29 Feb 2024 08:00) (18 - 19)  SpO2: 88% (29 Feb 2024 08:39) (88% - 97%)    Physical Exam:  Constitutional: NAD, awake and alert  Respiratory: cta b/l no wheezing no rhonchi  Cardiovascular: +s1/s2 no edema b/l le  Gastrointestinal: soft nt nd bs+  Vascular: 2+ peripheral pulses  Neurological: A/O x 3, no focal deficits

## 2024-02-29 NOTE — PROGRESS NOTE ADULT - PROBLEM SELECTOR PLAN 1
CT chest with findings of endobronchial lesion.   Imaging reviewed by Dr. Espitia, plan for bronchoscopy 2/28  Continue supportive care, supplement O2 as needed.   Continuous SpO2 monitoring.  Maintain SpO2 >92%, supplement with O2 therapy PRN.  Continue Abx per pulmonary.   Cardiology clearance obtained  Thoracic surgery will follow.   Plan discussed with Dr. Espitia.
CT chest with findings of endobronchial lesion.   Imaging reviewed by Dr. Espitia, plan for bronchoscopy 2/28  Continue supportive care, supplement O2 as needed.   Continuous SpO2 monitoring.  Maintain SpO2 >92%, supplement with O2 therapy PRN.  Continue Abx per pulmonary.   s/p bronch yesterday 2/28 with right mainstem endobronchial mass seen. Rt mainstem endobronchial mass brushing/BAL sent.   Pt to follow up with Dr. Espitia as an outpt on 3/11/24. Please include appointment in discharge instructions along with Dr. Espitia's office phone number, [phone].   Dr. Espitia wants pt to have a PET scan prior to office visit, script was sent to [facility] for PET scan. Please include [facility] phone number, [phone], for patient to call and schedule scan.   No contraindication for discharge from thoracic surgery standpoint.   Thoracic surgery will sign off.   Plan discussed with Dr. Espitia.
CT chest with findings of endobronchial lesion.   Imaging reviewed by Dr. Espitia, plan for bronchoscopy 2/28  Continue supportive care, supplement O2 as needed.   Continuous SpO2 monitoring.  Maintain SpO2 >92%, supplement with O2 therapy PRN.  Continue Abx per pulmonary.   Cardiology clearance obtained  Thoracic surgery will follow.   Plan discussed with Dr. Espitia.

## 2024-02-29 NOTE — DISCHARGE NOTE PROVIDER - HOSPITAL COURSE
75 yo male, hx of COPD not on home o2, CHF s/p AICD/PPM, HTN, HLD presenting for worsening dyspnea with associated cough, found to be hypoxic concerning for COPD exacerbation was admitted, incidentally found to have chest ct findings concerning for possible lung cancer.  Upon review of CTA Chest ordered earlier this month as well as CT Low dose lung cancer screening, there was noted mucous deposition, plugging noted in right upper lobe/bronchus; concern for possible malignancy.  CT chest on this admission showed Endobronchial lesion is noted in the right mainstem bronchus/right upper lobe bronchus.  Patient treated with IV Zithromax and IV steroids.  Patient followed by thoracic surgery.  Patient underwent bronchoscopy with BAL 2/28/24.  Patient transitioned to PO Prednisone.  Heme onc consulted.  Patient to follow up with pulmonary and thoracic surgery for pathology results.  Patient to have PET scan outpatient prior to appointments.  Patient had home O2 eval and requiring O2 on ambulation, 2Liters.  Patient stable for discharge to home with home care.      Vital Signs Last 24 Hrs  T(C): 36.7 (29 Feb 2024 08:00), Max: 37.3 (29 Feb 2024 05:00)  T(F): 98.1 (29 Feb 2024 08:00), Max: 99.1 (29 Feb 2024 05:00)  HR: 70 (29 Feb 2024 08:39) (68 - 80)  BP: 128/74 (29 Feb 2024 08:00) (123/71 - 129/75)  BP(mean): 89 (28 Feb 2024 16:36) (89 - 89)  RR: 18 (29 Feb 2024 08:00) (18 - 19)  SpO2: 88% (29 Feb 2024 08:39) (88% - 97%)    Parameters below as of 29 Feb 2024 08:39  Patient On (Oxygen Delivery Method): room air      PHYSICAL EXAM:  GENERAL: NAD  HEAD:  Atraumatic, Normocephalic  NECK: Supple, No JVD, Normal thyroid  NERVOUS SYSTEM:  Alert & Oriented X3, Good concentration; Motor Strength 5/5 B/L upper and lower extremities  CHEST/LUNG: Clear to auscultation bilaterally  HEART: Regular rate and rhythm; No murmurs, rubs, or gallops  ABDOMEN: Soft, Nontender, Nondistended; Bowel sounds present  EXTREMITIES:  2+ Peripheral Pulses, No clubbing, cyanosis, or edema  SKIN: No rashes or lesions   73 yo male, hx of COPD not on home o2, CHF s/p AICD/PPM, HTN, HLD presenting for worsening dyspnea with associated cough, found to be hypoxic concerning for COPD exacerbation was admitted, incidentally found to have chest ct findings concerning for possible lung cancer.  Upon review of CTA Chest ordered earlier this month as well as CT Low dose lung cancer screening, there was noted mucous deposition, plugging noted in right upper lobe/bronchus; concern for possible malignancy.  CT chest on this admission showed Endobronchial lesion is noted in the right mainstem bronchus/right upper lobe bronchus.  Patient treated with IV Zithromax and IV steroids.  Patient followed by thoracic surgery.  Patient underwent bronchoscopy with BAL 2/28/24.  Patient transitioned to PO Prednisone.  Heme onc consulted.  Patient to follow up with pulmonary and thoracic surgery for pathology results.  Patient to have PET scan outpatient prior to appointments.  Patient had home O2 eval and requiring O2 on ambulation, 2Liters.  Patient stable for discharge to home with home care.      Vital Signs Last 24 Hrs  T(C): 36.7 (29 Feb 2024 08:00), Max: 37.3 (29 Feb 2024 05:00)  T(F): 98.1 (29 Feb 2024 08:00), Max: 99.1 (29 Feb 2024 05:00)  HR: 70 (29 Feb 2024 08:39) (68 - 80)  BP: 128/74 (29 Feb 2024 08:00) (123/71 - 129/75)  BP(mean): 89 (28 Feb 2024 16:36) (89 - 89)  RR: 18 (29 Feb 2024 08:00) (18 - 19)  SpO2: 88% (29 Feb 2024 08:39) (88% - 97%)    Parameters below as of 29 Feb 2024 08:39  Patient On (Oxygen Delivery Method): room air      PHYSICAL EXAM:  GENERAL: NAD  NERVOUS SYSTEM:  Alert & Oriented X3, Good concentration; Motor Strength 5/5 B/L upper and lower extremities  CHEST/LUNG: Clear to auscultation bilaterally  HEART: Regular rate and rhythm; No murmurs, rubs, or gallops  ABDOMEN: Soft, Nontender, Nondistended; Bowel sounds present  EXTREMITIES:  2+ Peripheral Pulses, No clubbing, cyanosis, or edema  SKIN: No rashes or lesions   V-Y Plasty Text: The defect edges were debeveled with a #15 scalpel blade.  Given the location of the defect, shape of the defect and the proximity to free margins an V-Y advancement flap was deemed most appropriate.  Using a sterile surgical marker, an appropriate advancement flap was drawn incorporating the defect and placing the expected incisions within the relaxed skin tension lines where possible.    The area thus outlined was incised deep to adipose tissue with a #15 scalpel blade.  The skin margins were undermined to an appropriate distance in all directions utilizing iris scissors.

## 2024-02-29 NOTE — PROGRESS NOTE ADULT - ASSESSMENT
Status post COPD exacerbation with complicating mass now within the right mainstem bronchus and status post biopsy.  Patient stable for discharge and has already been evaluated by hematology oncology.  Patient does require follow-up in our office.  Steroids indicated by 10 mg every third day and the patient discharged on Trelegy at 100/62.5/25 1 elation per day. Status post COPD exacerbation with complicating new  mass now from 10/2023 on LDCT within the right mainstem bronchus and status post biopsy.  Patient stable for discharge and has already been evaluated by hematology oncology.  Patient does require follow-up in our office.  Steroids indicated by 10 mg every third day and the patient discharged on Trelegy at 100/62.5/25 1 puff per day. Pt will be call for office appointment

## 2024-02-29 NOTE — DISCHARGE NOTE PROVIDER - PROVIDER TOKENS
FREE:[LAST:[Primary doctor],PHONE:[(   )    -],FAX:[(   )    -]],PROVIDER:[TOKEN:[[id number]]],PROVIDER:[TOKEN:[[id number]]]

## 2024-02-29 NOTE — CONSULT NOTE ADULT - ASSESSMENT
73 yo male, hx of COPD not on home o2, CHF s/p AICD/PPM, HTN, HLD presenting for worsening dyspnea with associated cough. OP w/ elevated Dimer for which pulm sent him for DVT/PE studies that were negative. CTA did note right upper bronchus mucous plugging. CT-C in ER showing      2/23/24- CT-C wo IVC- Endobronchial lesionis noted in the right mainstem bronchus/right upper lobe bronchus. Exact etiology is unclear.? Primary consideration is lung carcinoma.    #Endobronchial lesion  -patient not up to date on age appropriate cancer screenings stating he has never had colonoscopy and never will.   -hx of smoking  -CT-C listed above  -CTC onboard sp EBUS/BX 2/28/24  -patient refusing any further w/u IP  RECS  -patient will need to establish pcp OP, and encourage compliance of age appropriate pre-cancer screenings  -plan to f/u OP w/ Dr. Espitia (Thoracic)  -scheduled for OP PET scan   -if med/onc care needed after f/u Dr. Espitia can be seen at  [facility] [address] [place] [address] [phone]     #Thrombocytopenia  -unclear baseline on admission plt 131 now trending down (85)  -no active bleeding/ecchymosis  -LFT's-NL  RECS  -trend CBC w/ diff  -Patient  can f/u OP with Heme OP  for further evaluation if plt dont normalize        Thank you for the referral.    Please call with any questions [phone]  Above reviewed with Attending Dr. Mace    [facility]  [address], [place] [id number] [phone]  *Note not finalized until signed by Attending Physician          73 yo male, hx of COPD not on home o2, CHF s/p AICD/PPM, HTN, HLD presenting for worsening dyspnea with associated cough. OP w/ elevated Dimer for which pulm sent him for DVT/PE studies that were negative. CTA did note right upper bronchus mucous plugging. CT-C in ER showing      2/23/24- CT-C wo IVC- Endobronchial lesion is noted in the right mainstem bronchus/right upper lobe bronchus. Exact etiology is unclear.? Primary consideration is lung carcinoma.    #Endobronchial lesion  -patient not up to date on age appropriate cancer screenings stating he has never had colonoscopy and never will.   -hx of smoking  -CT-C listed above  -CTC onboard sp EBUS/BX 2/28/24  -patient refusing any further w/u IP  RECS  -patient will need to establish pcp OP, and encourage compliance of age appropriate pre-cancer screenings  -plan to f/u OP w/ Dr. Espitia (Thoracic)  -scheduled for OP PET scan   -if med/onc care needed after f/u Dr. Espitia can be seen at  [facility] [address] [place] [address] [phone]     #Thrombocytopenia  -unclear baseline on admission plt 131 now trending down (85)  -no active bleeding/ecchymosis  -LFT's-NL  RECS  -trend CBC w/ diff  -Patient  can f/u OP with Heme OP  for further evaluation if plt dont normalize        Thank you for the referral.    Please call with any questions [phone]  Above reviewed with Attending Dr. Mace    [facility]  [address], [place] [id number] [phone]  *Note not finalized until signed by Attending Physician

## 2024-02-29 NOTE — PROGRESS NOTE ADULT - SUBJECTIVE AND OBJECTIVE BOX
Subjective:  Patient seen and examined for endobronchial mass, s/p bronchoscopy 2/28. Patient resting in bed / OOB to chair and in no apparent distress. Patient denies chest pain, SOB, abdominal pain, N/V/D/C, or any other acute complaints at this time.      PAST MEDICAL & SURGICAL HISTORY:  Chronic systolic congestive heart failure    Benign essential HTN    HLD (hyperlipidemia)    Advanced COPD    H/O removal of cyst    VITAL SIGNS  Vital Signs Last 24 Hrs  T(C): 37.3 (02-29-24 @ 05:00), Max: 37.3 (02-29-24 @ 05:00)  T(F): 99.1 (02-29-24 @ 05:00), Max: 99.1 (02-29-24 @ 05:00)  HR: 68 (02-29-24 @ 05:00) (60 - 80)  BP: 129/75 (02-29-24 @ 05:00) (123/71 - 129/75)  RR: 18 (02-29-24 @ 05:00) (18 - 19)  SpO2: 92% (02-29-24 @ 05:00) (92% - 97%)  on (O2)              MEDICATIONS  albuterol    90 MICROgram(s) HFA Inhaler 2 Puff(s) Inhalation every 3 hours PRN  albuterol/ipratropium for Nebulization 3 milliLiter(s) Nebulizer every 6 hours  atorvastatin 20 milliGRAM(s) Oral at bedtime  azithromycin  IVPB 500 milliGRAM(s) IV Intermittent every 24 hours  guaiFENesin  milliGRAM(s) Oral every 12 hours  hydrochlorothiazide 25 milliGRAM(s) Oral daily  losartan 100 milliGRAM(s) Oral daily  metoprolol tartrate 75 milliGRAM(s) Oral two times a day  pantoprazole    Tablet 40 milliGRAM(s) Oral before breakfast  predniSONE   Tablet 50 milliGRAM(s) Oral daily        02-28 @ 07:01  -  02-29 @ 07:00  --------------------------------------------------------  IN: 0 mL / OUT: 400 mL / NET: -400 mL      Weights:  Daily     Daily   Admit Wt: Drug Dosing Weight    Weight (kg): 86.2 (23 Feb 2024 11:33)    LABS  02-29    132<L>  |  95<L>  |  40.4<H>  ----------------------------<  197<H>  4.1   |  26.0  |  1.72<H>    Ca    8.8      29 Feb 2024 06:16    TPro  5.7<L>  /  Alb  3.5  /  TBili  0.3<L>  /  DBili  x   /  AST  22  /  ALT  24  /  AlkPhos  58  02-29                                 13.8   8.01  )-----------( 85       ( 29 Feb 2024 06:16 )             39.6          PT/INR - ( 28 Feb 2024 06:11 )   PT: 11.1 sec;   INR: 1.00 ratio         PTT - ( 28 Feb 2024 06:11 )  PTT:23.6 sec  Bilirubin Total: 0.3 mg/dL (02-29 @ 06:16)      DIAGNOSTICS:  All laboratory results, radiology and medications reviewed.    < from: CT Chest No Cont (02.23.24 @ 20:31) >  IMPRESSION: Endobronchial lesion is noted in the right mainstem   bronchus/right upper lobe bronchus. Exact etiology is unclear. Primary   consideration is lung carcinoma.    --- End of Report ---    < end of copied text >      PHYSICAL EXAM  General: *well nourished, well developed, no acute distress  Neurology: *alert and oriented x 3, nonfocal, no gross deficits  Respiratory: clear to auscultation bilaterally*  CV: regular rate and rhythm, normal S1, S2*  Abdomen: soft, nontender, nondistended, positive bowel sounds, last bowel movement   Extremities: warm, well perfused. no edema*. + DP pulses         Subjective:  Patient seen and examined for endobronchial mass, s/p bronchoscopy 2/28. Patient sitting at the edge of the bed, in no apparent distress. Patient reports "I'm going home today". Patient denies chest pain, SOB, abdominal pain, N/V/D/C, or any other acute complaints at this time.      PAST MEDICAL & SURGICAL HISTORY:  Chronic systolic congestive heart failure    Benign essential HTN    HLD (hyperlipidemia)    Advanced COPD    H/O removal of cyst    VITAL SIGNS  Vital Signs Last 24 Hrs  T(C): 37.3 (02-29-24 @ 05:00), Max: 37.3 (02-29-24 @ 05:00)  T(F): 99.1 (02-29-24 @ 05:00), Max: 99.1 (02-29-24 @ 05:00)  HR: 68 (02-29-24 @ 05:00) (60 - 80)  BP: 129/75 (02-29-24 @ 05:00) (123/71 - 129/75)  RR: 18 (02-29-24 @ 05:00) (18 - 19)  SpO2: 92% (02-29-24 @ 05:00) (92% - 97%)  on (O2)              MEDICATIONS  albuterol    90 MICROgram(s) HFA Inhaler 2 Puff(s) Inhalation every 3 hours PRN  albuterol/ipratropium for Nebulization 3 milliLiter(s) Nebulizer every 6 hours  atorvastatin 20 milliGRAM(s) Oral at bedtime  azithromycin  IVPB 500 milliGRAM(s) IV Intermittent every 24 hours  guaiFENesin  milliGRAM(s) Oral every 12 hours  hydrochlorothiazide 25 milliGRAM(s) Oral daily  losartan 100 milliGRAM(s) Oral daily  metoprolol tartrate 75 milliGRAM(s) Oral two times a day  pantoprazole    Tablet 40 milliGRAM(s) Oral before breakfast  predniSONE   Tablet 50 milliGRAM(s) Oral daily        02-28 @ 07:01  -  02-29 @ 07:00  --------------------------------------------------------  IN: 0 mL / OUT: 400 mL / NET: -400 mL      Weights:  Daily     Daily   Admit Wt: Drug Dosing Weight    Weight (kg): 86.2 (23 Feb 2024 11:33)    LABS  02-29    132<L>  |  95<L>  |  40.4<H>  ----------------------------<  197<H>  4.1   |  26.0  |  1.72<H>    Ca    8.8      29 Feb 2024 06:16    TPro  5.7<L>  /  Alb  3.5  /  TBili  0.3<L>  /  DBili  x   /  AST  22  /  ALT  24  /  AlkPhos  58  02-29                                 13.8   8.01  )-----------( 85       ( 29 Feb 2024 06:16 )             39.6          PT/INR - ( 28 Feb 2024 06:11 )   PT: 11.1 sec;   INR: 1.00 ratio         PTT - ( 28 Feb 2024 06:11 )  PTT:23.6 sec  Bilirubin Total: 0.3 mg/dL (02-29 @ 06:16)      DIAGNOSTICS:  All laboratory results, radiology and medications reviewed.    < from: CT Chest No Cont (02.23.24 @ 20:31) >  IMPRESSION: Endobronchial lesion is noted in the right mainstem   bronchus/right upper lobe bronchus. Exact etiology is unclear. Primary   consideration is lung carcinoma.    --- End of Report ---    < end of copied text >      PHYSICAL EXAM  General: no acute distress  Neurology: alert and oriented x 3, nonfocal, no gross deficits  Respiratory: clear to auscultation bilaterally, no accessory muscle use noted  CV: regular rate and rhythm, normal S1, S2  Abdomen: soft, nontender, nondistended, + bowel sounds, last bowel movement   Extremities: warm, well perfused. no edema, + DP pulses

## 2024-02-29 NOTE — DISCHARGE NOTE PROVIDER - NSDCCPCAREPLAN_GEN_ALL_CORE_FT
PRINCIPAL DISCHARGE DIAGNOSIS  Diagnosis: COPD exacerbation  Assessment and Plan of Treatment: Complete steroid course.   Continue current medications.  Follow up with primary doctor and pulmonary.      SECONDARY DISCHARGE DIAGNOSES  Diagnosis: Endobronchial mass  Assessment and Plan of Treatment: Follow up with Dr. Espitia for pathology results.  PET scan to be completed prior to appointment.  Follow up with Primary doctor and pulmonary.  Follow up with [facility] onc after following Dr. Espitia.

## 2024-02-29 NOTE — PROGRESS NOTE ADULT - ASSESSMENT
75 yo male, hx of COPD not on home O2, CHF s/p AICD/PPM, HTN, HLD presented to [facility] ED c/o SOB and cough.  Pt reports since January he has been experiencing SOB with an associated cough, pt followed up with his pulmonologist and was started on Prednisone & ABx. He reports his symptoms had mildly improved initially but after 2-3 weeks still had a persistent cough & SOB. Pt followed up with pulm and was given another round of steroids & Abx. Pt found to have elevated d-dimer by pulm, was sent for CTA which was negative for PE but noted right upper bronchus plugging. Pt reports over the past week he has noticed that his symptoms had not been improving with persistent shortness of breath doing daily activities and a persistent cough, pt decided to come to the ED for evaluation. CT chest in ED revealed an endobronchial lesion in the right mainstem. Thoracic surgery consulted for bronch/biopsy. S/p bronch 2/28.

## 2024-02-29 NOTE — DISCHARGE NOTE PROVIDER - NSDCFUADDAPPT_GEN_ALL_CORE_FT
PT DECLINED [facility] TO BEDS  PT AGREEABLE WITH [facility]  PT AGREEABLE TO OUTPT F/U APPTS  PLEASE GIVE YELLOW FOLDER TO PT.

## 2024-02-29 NOTE — PROGRESS NOTE ADULT - REASON FOR ADMISSION
COPD exacerbation,

## 2024-02-29 NOTE — DISCHARGE NOTE PROVIDER - CARE PROVIDERS DIRECT ADDRESSES
,[email or web address]

## 2024-02-29 NOTE — DISCHARGE NOTE PROVIDER - NSDCFUSCHEDAPPT_GEN_ALL_CORE_FT
Hunter Espitia  [facility]  THORSURG [address]  Scheduled Appointment: 03/11/2024    Kleber Haley  [facility]  ORTHOSURG [address]  Scheduled Appointment: 03/21/2024    Peter Reyes  [facility]  PULED [address]  Scheduled Appointment: 04/10/2024    [facility] [address]  Scheduled Appointment: 04/19/2024

## 2024-02-29 NOTE — CONSULT NOTE ADULT - SUBJECTIVE AND OBJECTIVE BOX
Hematology Consult Note    HPI:  73 yo male, hx of COPD not on home o2, CHF s/p AICD/PPM, HTN, HLD presenting for worsening dyspnea with associated cough. Says that for last 6-8 weeks he has been having cough and sob that initially started with uri symptoms. Says he followed up with his pulm, was started on prednisone and abx. Says symptoms had mildly improved a bit but after 2-3 weeks, still was coughing and having dyspnea and was started again on another round of prednisone and azithro. He had labs drawn with elevated Dimer for which pulm sent him for DVT/PE studies that were negative. CTA did note right upper bronchus mucous plugging. Patient says that he presented today to ED because he feels that his dyspnea was worsening even at rest. Says that he originally had blood streaked sputum that cleared but still gets some pink colored sputum. Denied fevers, chills, wt loss, night sweats, chest pain, palpitations, abdominal pain, n/v/d, dysuria, numbness, headaches, dizziness, blurry vision.     In ED: noted hypoxic 87%, given solumedrol, duonebs, rocephin and mag. CT Chest done showing Endobronchial lesion is noted in the right mainstem bronchus/right upper lobe bronchus. Exact etiology is unclear. Primary consideration is lung carcinoma. Admit to Medicine called.    (24 Feb 2024 00:10)      Allergies    No Known Allergies    Intolerances        MEDICATIONS  (STANDING):  albuterol/ipratropium for Nebulization 3 milliLiter(s) Nebulizer every 6 hours  atorvastatin 20 milliGRAM(s) Oral at bedtime  azithromycin  IVPB 500 milliGRAM(s) IV Intermittent every 24 hours  guaiFENesin  milliGRAM(s) Oral every 12 hours  heparin   Injectable 5000 Unit(s) SubCutaneous every 12 hours  hydrochlorothiazide 25 milliGRAM(s) Oral daily  losartan 100 milliGRAM(s) Oral daily  metoprolol tartrate 75 milliGRAM(s) Oral two times a day  pantoprazole    Tablet 40 milliGRAM(s) Oral before breakfast  predniSONE   Tablet 50 milliGRAM(s) Oral daily    MEDICATIONS  (PRN):  albuterol    90 MICROgram(s) HFA Inhaler 2 Puff(s) Inhalation every 3 hours PRN Shortness of Breath and/or Wheezing      PAST MEDICAL & SURGICAL HISTORY:  Chronic systolic congestive heart failure      Benign essential HTN      HLD (hyperlipidemia)      Advanced COPD      H/O removal of cyst          FAMILY HISTORY:  FH: prostate cancer (Father)    FH: COPD (chronic obstructive pulmonary disease) (Mother)        SOCIAL HISTORY: No EtOH, no tobacco    REVIEW OF SYSTEMS:    CONSTITUTIONAL: No weakness, fevers or chills  EYES/ENT: No visual changes;  No vertigo or throat pain   NECK: No pain or stiffness  RESPIRATORY: sob  CARDIOVASCULAR: No chest pain or palpitations  GASTROINTESTINAL: No abdominal or epigastric pain. No nausea, vomiting, or hematemesis; No diarrhea or constipation. No melena or hematochezia.  GENITOURINARY: No dysuria, frequency or hematuria  NEUROLOGICAL: No numbness or weakness  SKIN: No itching, burning, rashes, or lesions   All other review of systems is negative unless indicated above.        T(F): 98.1 (02-29-24 @ 08:00), Max: 99.1 (02-29-24 @ 05:00)  HR: 70 (02-29-24 @ 08:39)  BP: 128/74 (02-29-24 @ 08:00)  RR: 18 (02-29-24 @ 08:00)  SpO2: 88% (02-29-24 @ 08:39)  Wt(kg): --    GENERAL: NAD, well-developed  HEAD:  Atraumatic, Normocephalic  EYES: EOMI, PERRLA, conjunctiva and sclera clear  NECK: Supple, No JVD  CHEST/LUNG: diminished b/l   HEART: Regular rate and rhythm; No murmurs, rubs, or gallops  ABDOMEN: Soft, Nontender, Nondistended; Bowel sounds present  EXTREMITIES:  2+ Peripheral Pulses, No clubbing, cyanosis, or edema  NEUROLOGY: non-focal  SKIN: No rashes or lesions                          13.8   8.01  )-----------( 85       ( 29 Feb 2024 06:16 )             39.6       02-29    132<L>  |  95<L>  |  40.4<H>  ----------------------------<  197<H>  4.1   |  26.0  |  1.72<H>    Ca    8.8      29 Feb 2024 06:16    TPro  5.7<L>  /  Alb  3.5  /  TBili  0.3<L>  /  DBili  x   /  AST  22  /  ALT  24  /  AlkPhos  58  02-29        < from: CT Chest No Cont (02.23.24 @ 20:31) >  ACC: [id number] EXAM:  CT CHEST   ORDERED BY: LOVE SON     PROCEDURE DATE:  02/23/2024          INTERPRETATION:  Clinical information: Shortness of breath. Exam is   compared to previous studies of 2/2/2024 as well as 10/26/2023.    CT scan of thechest was obtained without administration of intravenous   contrast.    Small low-attenuation lesion is noted in the right lobe of the thyroid   gland.    No hilar or mediastinal adenopathy is noted.    Heart is normal in size. An AICD is noted in place. No pericardial   effusion is noted.    Once again, an endobronchial lesion is noted in the right mainstem   bronchus/right upper lobe bronchus. Mucous/secretions are noted within   the right middle and right lower lobe bronchi. Resultant partial   atelectasis of the right upper and right middle lobes is noted. In   addition, tree-in-bud opacities likely representing mucoid impacted   airways are noted in the right upper lobe. Few less than 0.5 cm solid   nodules are noted in both lungs. No pleural effusions are noted.    Below the diaphragm, visualized portions of the abdomen demonstrate   thickening of the left adrenal gland. Small low-attenuation lesions in   both kidneys are too small to be adequately characterized on this exam.    Degenerative changes of the spine are noted.    IMPRESSION: Endobronchial lesion is noted in the right mainstem   bronchus/right upper lobe bronchus. Exact etiology is unclear. Primary   consideration is lung carcinoma.    --- End of Report ---

## 2024-02-29 NOTE — PROGRESS NOTE ADULT - PROVIDER SPECIALTY LIST ADULT
Internal Medicine
Pulmonology
Hospitalist
Thoracic Surgery

## 2024-02-29 NOTE — DISCHARGE NOTE PROVIDER - NSDCHHNEEDSERVICE_GEN_ALL_CORE
Medication teaching and assessment/Observation and assessment
This document is complete and the patient is ready for discharge.

## 2024-02-29 NOTE — DISCHARGE NOTE PROVIDER - NSDCMRMEDTOKEN_GEN_ALL_CORE_FT
albuterol 90 mcg/inh inhalation aerosol: 2 puff(s) inhaled every 3 hours As needed Shortness of Breath and/or Wheezing  atorvastatin 20 mg oral tablet: 1 tab(s) orally once a day  guaiFENesin 600 mg oral tablet, extended release: 1 tab(s) orally every 12 hours  hydroCHLOROthiazide 25 mg oral tablet: 1 tab(s) orally once a day  losartan 100 mg oral tablet: 1 tab(s) orally once a day  Metoprolol Tartrate 75 mg oral tablet: 1 tab(s) orally 2 times a day  pantoprazole 40 mg oral delayed release tablet: 1 tab(s) orally once a day (before a meal)  predniSONE 10 mg oral tablet: 5 tab(s) orally once a day x3days, then 4tabs PO daily x3days, then 3tabs PO daily x3days, then 2tabs PO daily x3days, then 1tab PO daily x3days  predniSONE 50 mg oral tablet: 1 tab(s) orally once a day for bronchospasms  Trelegy Ellipta 100 mcg-62.5 mcg-25 mcg/inh inhalation powder: 1 puff(s) inhaled once a day   albuterol 90 mcg/inh inhalation aerosol: 2 puff(s) inhaled every 3 hours As needed Shortness of Breath and/or Wheezing  atorvastatin 20 mg oral tablet: 1 tab(s) orally once a day  guaiFENesin 600 mg oral tablet, extended release: 1 tab(s) orally every 12 hours  hydroCHLOROthiazide 25 mg oral tablet: 1 tab(s) orally once a day  losartan 100 mg oral tablet: 1 tab(s) orally once a day  Metoprolol Tartrate 75 mg oral tablet: 1 tab(s) orally 2 times a day  pantoprazole 40 mg oral delayed release tablet: 1 tab(s) orally once a day (before a meal)  predniSONE 10 mg oral tablet: 5 tab(s) orally once a day x3days, then 4tabs PO daily x3days, then 3tabs PO daily x3days, then 2tabs PO daily x3days, then 1tab PO daily x3days  Trelegy Ellipta 100 mcg-62.5 mcg-25 mcg/inh inhalation powder: 1 puff(s) inhaled once a day

## 2024-02-29 NOTE — PROGRESS NOTE ADULT - SUBJECTIVE AND OBJECTIVE BOX
PULMONARY PROGRESS NOTE      [id number]    Patient is a 74y old  Male who presents with a chief complaint of COPD exacerbation, (29 Feb 2024 10:17)      INTERVAL HPI/OVERNIGHT EVENTS:  74-year-old male with a history of chronic obstructive lung disease, heart failure status post AICD/permanent pacemaker, hypertension, hyperlipidemia seen today in follow-up.  Patient had worsening cough and was treated with a course of steroids and antibiotics CAT scan of the chest demonstrated a right upper lobe obstruction with questionable mucous plugging.  Patient improved but underwent a fiberoptic bronchoscopy yesterday.  This demonstrated a mass within the right mainstem mass which time biopsies were taken.    MEDICATIONS  (STANDING):  albuterol/ipratropium for Nebulization 3 milliLiter(s) Nebulizer every 6 hours  atorvastatin 20 milliGRAM(s) Oral at bedtime  azithromycin  IVPB 500 milliGRAM(s) IV Intermittent every 24 hours  guaiFENesin  milliGRAM(s) Oral every 12 hours  heparin   Injectable 5000 Unit(s) SubCutaneous every 12 hours  hydrochlorothiazide 25 milliGRAM(s) Oral daily  losartan 100 milliGRAM(s) Oral daily  metoprolol tartrate 75 milliGRAM(s) Oral two times a day  pantoprazole    Tablet 40 milliGRAM(s) Oral before breakfast  predniSONE   Tablet 50 milliGRAM(s) Oral daily      MEDICATIONS  (PRN):  albuterol    90 MICROgram(s) HFA Inhaler 2 Puff(s) Inhalation every 3 hours PRN Shortness of Breath and/or Wheezing      Allergies    No Known Allergies    Intolerances        PAST MEDICAL & SURGICAL HISTORY:  Chronic systolic congestive heart failure      Benign essential HTN      HLD (hyperlipidemia)      Advanced COPD      H/O removal of cyst          SOCIAL HISTORY  Smoking History:       REVIEW OF SYSTEMS:    CONSTITUTIONAL:  No distress    HEENT:  Eyes:  No diplopia or blurred vision. ENT:  No earache, sore throat or runny nose.    CARDIOVASCULAR:  No pressure, squeezing, tightness, heaviness or aching about the chest; no palpitations.    RESPIRATORY:  No cough, shortness of breath, PND or orthopnea. Mild SOBOE    GASTROINTESTINAL:  No nausea, vomiting or diarrhea.    GENITOURINARY:  No dysuria, frequency or urgency.    NEUROLOGIC:  No paresthesias, fasciculations, seizures or weakness.    Extremities: No cyanosis, clubbing or edema    PSYCHIATRIC:  No disorder of thought or mood.    Vital Signs Last 24 Hrs  T(C): 36.7 (29 Feb 2024 08:00), Max: 37.3 (29 Feb 2024 05:00)  T(F): 98.1 (29 Feb 2024 08:00), Max: 99.1 (29 Feb 2024 05:00)  HR: 70 (29 Feb 2024 08:39) (68 - 80)  BP: 128/74 (29 Feb 2024 08:00) (123/71 - 129/75)  BP(mean): 89 (28 Feb 2024 16:36) (89 - 89)  RR: 18 (29 Feb 2024 08:00) (18 - 19)  SpO2: 88% (29 Feb 2024 08:39) (88% - 97%)    Parameters below as of 29 Feb 2024 08:39  Patient On (Oxygen Delivery Method): room air        PHYSICAL EXAMINATION:    GENERAL: The patient is awake and alert in no apparent distress.     HEENT: Head is normocephalic and atraumatic. Extraocular muscles are intact. Mucous membranes are moist.    NECK: Supple.    LUNGS: Clear to auscultation without wheezing, rales or rhonchi; respirations unlabored    HEART: Regular rate and rhythm without murmur.    ABDOMEN: Soft, nontender, and nondistended.      EXTREMITIES: Without any cyanosis, clubbing, rash, lesions or edema.    NEUROLOGIC: Grossly intact.    LABS:                        13.8   8.01  )-----------( 85       ( 29 Feb 2024 06:16 )             39.6     02-29    132<L>  |  95<L>  |  40.4<H>  ----------------------------<  197<H>  4.1   |  26.0  |  1.72<H>    Ca    8.8      29 Feb 2024 06:16    TPro  5.7<L>  /  Alb  3.5  /  TBili  0.3<L>  /  DBili  x   /  AST  22  /  ALT  24  /  AlkPhos  58  02-29    PT/INR - ( 28 Feb 2024 06:11 )   PT: 11.1 sec;   INR: 1.00 ratio         PTT - ( 28 Feb 2024 06:11 )  PTT:23.6 sec  Urinalysis Basic - ( 29 Feb 2024 06:16 )    Color: x / Appearance: x / SG: x / pH: x  Gluc: 197 mg/dL / Ketone: x  / Bili: x / Urobili: x   Blood: x / Protein: x / Nitrite: x   Leuk Esterase: x / RBC: x / WBC x   Sq Epi: x / Non Sq Epi: x / Bacteria: x                      MICROBIOLOGY:    RADIOLOGY & ADDITIONAL STUDIES:  < from: CT Chest No Cont (02.23.24 @ 20:31) >  ACC: [id number] EXAM:  CT CHEST   ORDERED BY: LOVE SON     PROCEDURE DATE:  02/23/2024          INTERPRETATION:  Clinical information: Shortness of breath. Exam is   compared to previous studies of 2/2/2024 as well as 10/26/2023.    CT scan of thechest was obtained without administration of intravenous   contrast.    Small low-attenuation lesion is noted in the right lobe of the thyroid   gland.    No hilar or mediastinal adenopathy is noted.    Heart is normal in size. An AICD is noted in place. No pericardial   effusion is noted.    Once again, an endobronchial lesion is noted in the right mainstem   bronchus/right upper lobe bronchus. Mucous/secretions are noted within   the right middle and right lower lobe bronchi. Resultant partial   atelectasis of the right upper and right middle lobes is noted. In   addition, tree-in-bud opacities likely representing mucoid impacted   airways are noted in the right upper lobe. Few less than 0.5 cm solid   nodules are noted in both lungs. No pleural effusions are noted.    Below the diaphragm, visualized portions of the abdomen demonstrate   thickening of the left adrenal gland. Small low-attenuation lesions in   both kidneys are too small to be adequately characterized on this exam.    Degenerative changes of the spine are noted.    IMPRESSION: Endobronchial lesion is noted in the right mainstem   bronchus/right upper lobe bronchus. Exact etiology is unclear. Primary   consideration is lung carcinoma.    --- End of Report ---            IRA VARGAS MD; Attending Radiologist  This document has been electronically signed. Feb 23 2024  9:00PM      < end of copied text >    Path: Pending PULMONARY PROGRESS NOTE      [facility]-[id number]    Patient is a 74y old  Male who presents with a chief complaint of COPD exacerbation, (29 Feb 2024 10:17)      INTERVAL HPI/OVERNIGHT EVENTS:  74-year-old male with a history of chronic obstructive lung disease, heart failure status post AICD/permanent pacemaker, hypertension, hyperlipidemia seen today in follow-up.  Patient had worsening cough and was treated with a course of steroids and antibiotics CAT scan of the chest demonstrated a right upper lobe obstruction with questionable mucous plugging.  Patient improved but underwent a fiberoptic bronchoscopy yesterday.  This demonstrated a mass within the right mainstem mass which time biopsies were taken.  Prior LDCT demonstrated only mucous plugging in RUL bronchus w/o mass. No distress    MEDICATIONS  (STANDING):  albuterol/ipratropium for Nebulization 3 milliLiter(s) Nebulizer every 6 hours  atorvastatin 20 milliGRAM(s) Oral at bedtime  azithromycin  IVPB 500 milliGRAM(s) IV Intermittent every 24 hours  guaiFENesin  milliGRAM(s) Oral every 12 hours  heparin   Injectable 5000 Unit(s) SubCutaneous every 12 hours  hydrochlorothiazide 25 milliGRAM(s) Oral daily  losartan 100 milliGRAM(s) Oral daily  metoprolol tartrate 75 milliGRAM(s) Oral two times a day  pantoprazole    Tablet 40 milliGRAM(s) Oral before breakfast  predniSONE   Tablet 50 milliGRAM(s) Oral daily      MEDICATIONS  (PRN):  albuterol    90 MICROgram(s) HFA Inhaler 2 Puff(s) Inhalation every 3 hours PRN Shortness of Breath and/or Wheezing      Allergies    No Known Allergies    Intolerances        PAST MEDICAL & SURGICAL HISTORY:  Chronic systolic congestive heart failure      Benign essential HTN      HLD (hyperlipidemia)      Advanced COPD      H/O removal of cyst          SOCIAL HISTORY  Smoking History:       REVIEW OF SYSTEMS:    CONSTITUTIONAL:  No distress    HEENT:  Eyes:  No diplopia or blurred vision. ENT:  No earache, sore throat or runny nose.    CARDIOVASCULAR:  No pressure, squeezing, tightness, heaviness or aching about the chest; no palpitations.    RESPIRATORY:  No cough, shortness of breath, PND or orthopnea. Mild SOBOE    GASTROINTESTINAL:  No nausea, vomiting or diarrhea.    GENITOURINARY:  No dysuria, frequency or urgency.    NEUROLOGIC:  No paresthesias, fasciculations, seizures or weakness.    Extremities: No cyanosis, clubbing or edema    PSYCHIATRIC:  No disorder of thought or mood.    Vital Signs Last 24 Hrs  T(C): 36.7 (29 Feb 2024 08:00), Max: 37.3 (29 Feb 2024 05:00)  T(F): 98.1 (29 Feb 2024 08:00), Max: 99.1 (29 Feb 2024 05:00)  HR: 70 (29 Feb 2024 08:39) (68 - 80)  BP: 128/74 (29 Feb 2024 08:00) (123/71 - 129/75)  BP(mean): 89 (28 Feb 2024 16:36) (89 - 89)  RR: 18 (29 Feb 2024 08:00) (18 - 19)  SpO2: 88% (29 Feb 2024 08:39) (88% - 97%)    Parameters below as of 29 Feb 2024 08:39  Patient On (Oxygen Delivery Method): room air        PHYSICAL EXAMINATION:    GENERAL: The patient is awake and alert in no apparent distress.     HEENT: Head is normocephalic and atraumatic. Extraocular muscles are intact. Mucous membranes are moist.    NECK: Supple.    LUNGS: Clear to auscultation without wheezing, rales or rhonchi; respirations unlabored    HEART: Regular rate and rhythm without murmur.    ABDOMEN: Soft, nontender, and nondistended.      EXTREMITIES: Without any cyanosis, clubbing, rash, lesions or edema.    NEUROLOGIC: Grossly intact.    LABS:                        13.8   8.01  )-----------( 85       ( 29 Feb 2024 06:16 )             39.6     02-29    132<L>  |  95<L>  |  40.4<H>  ----------------------------<  197<H>  4.1   |  26.0  |  1.72<H>    Ca    8.8      29 Feb 2024 06:16    TPro  5.7<L>  /  Alb  3.5  /  TBili  0.3<L>  /  DBili  x   /  AST  22  /  ALT  24  /  AlkPhos  58  02-29    PT/INR - ( 28 Feb 2024 06:11 )   PT: 11.1 sec;   INR: 1.00 ratio         PTT - ( 28 Feb 2024 06:11 )  PTT:23.6 sec  Urinalysis Basic - ( 29 Feb 2024 06:16 )    Color: x / Appearance: x / SG: x / pH: x  Gluc: 197 mg/dL / Ketone: x  / Bili: x / Urobili: x   Blood: x / Protein: x / Nitrite: x   Leuk Esterase: x / RBC: x / WBC x   Sq Epi: x / Non Sq Epi: x / Bacteria: x                      MICROBIOLOGY:    RADIOLOGY & ADDITIONAL STUDIES:  < from: CT Chest No Cont (02.23.24 @ 20:31) >  ACC: [id number] EXAM:  CT CHEST   ORDERED BY: LOVE SON     PROCEDURE DATE:  02/23/2024          INTERPRETATION:  Clinical information: Shortness of breath. Exam is   compared to previous studies of 2/2/2024 as well as 10/26/2023.    CT scan of thechest was obtained without administration of intravenous   contrast.    Small low-attenuation lesion is noted in the right lobe of the thyroid   gland.    No hilar or mediastinal adenopathy is noted.    Heart is normal in size. An AICD is noted in place. No pericardial   effusion is noted.    Once again, an endobronchial lesion is noted in the right mainstem   bronchus/right upper lobe bronchus. Mucous/secretions are noted within   the right middle and right lower lobe bronchi. Resultant partial   atelectasis of the right upper and right middle lobes is noted. In   addition, tree-in-bud opacities likely representing mucoid impacted   airways are noted in the right upper lobe. Few less than 0.5 cm solid   nodules are noted in both lungs. No pleural effusions are noted.    Below the diaphragm, visualized portions of the abdomen demonstrate   thickening of the left adrenal gland. Small low-attenuation lesions in   both kidneys are too small to be adequately characterized on this exam.    Degenerative changes of the spine are noted.    IMPRESSION: Endobronchial lesion is noted in the right mainstem   bronchus/right upper lobe bronchus. Exact etiology is unclear. Primary   consideration is lung carcinoma.      IRA VARGAS MD; Attending Radiologist  This document has been electronically signed. Feb 23 2024  9:00PM      < end of copied text >    Path: Pending      EXAM: [id number] - CT LDCT LUNG CA SCREENING - ORDERED BY: ERIK WOODS      PROCEDURE DATE: 10/26/2023        INTERPRETATION: INDICATION: Lung cancer screening, former smoker, 68 pack years smoking history  TECHNIQUE: Unenhanced CT of the chest. Coronal, sagittal, and MIP images were reconstructed and reviewed.  COMPARISON: 10/18/2022 chest CT.    FINDINGS:    AIRWAYS, LUNGS, PLEURA: Mucoid impaction of segmental branches of right upper lobe bronchus increased from prior exam.. Emphysema. Bandlike atelectasis/scarring within bilateral upper lobes, right middle lobe and lingula.  Scattered smaller than 6 mm juxtapleural nodules likely benign intrapulmonary lymph nodes. No pleural effusion.    LYMPH NODES, MEDIASTINUM: No lymphadenopathy.    HEART, VESSELS: Left ICD in place. Abandoned lead is present. Heart size is normal. No pericardial effusion. Thoracic aorta normal in diameter. Minimal aortic and coronary calcification.    VISUALIZED UPPER ABDOMEN: Within normal limits.    CHEST WALL, BONES: No aggressive osseous lesion. Stable compression deformity of T3-T7 vertebral bodies.    LOWER NECK: Within normal limits.    IMPRESSION:    In comparison with 10/18/2022, increased mucoid impaction of the right upper lobe bronchus.    Stable scattered smaller than 6 mm juxtapleural nodules.    Lung-RADS 2 (Benign Appearance or Behavior): Continue annual screening with low-dose chest CT in 12 months if the patient continues to meet eligibility criteria.      CHARLINE ANAYA MD; Attending Radiologist  This document has been electronically signed. Nov 2 2023 12:15PM

## 2024-02-29 NOTE — CONSULT NOTE ADULT - TIME BILLING
The necessity of the time spent during the encounter on this date of service was due to:  Review of notes, orders, labs and images on all accessible EHR platforms  Bedside evaluation  Coordination with all active services, nursing and respiratory
MDM

## 2024-02-29 NOTE — DISCHARGE NOTE PROVIDER - CARE PROVIDER_API CALL
Primary doctor,   Phone: (   )    -  Fax: (   )    -  Follow Up Time:     Hunter Espitia  Thoracic Surgery  [address] [place] [address]  Phone: [phone]  Fax: [phone]  Follow Up Time:     Peter Reyes  Pulmonary Disease  [address] [facility], [address]  [place] [address]  Phone: [phone]  Fax: [phone]  Follow Up Time:

## 2024-03-06 PROBLEM — J98.59 LESION OF MEDIASTINUM: Status: ACTIVE | Noted: 2024-01-01

## 2024-03-06 PROBLEM — J44.1 COPD EXACERBATION: Status: ACTIVE | Noted: 2024-01-01

## 2024-03-11 PROBLEM — Z80.8 FAMILY HISTORY OF MALIGNANT NEOPLASM OF SKIN: Status: ACTIVE | Noted: 2024-01-01

## 2024-03-11 PROBLEM — Z87.891 FORMER SMOKER: Status: ACTIVE | Noted: 2021-01-12

## 2024-03-11 PROBLEM — Z87.891 FORMER CIGARETTE SMOKER: Status: ACTIVE | Noted: 2021-01-13

## 2024-03-11 PROBLEM — Z78.9 SOCIAL ALCOHOL USE: Status: ACTIVE | Noted: 2024-01-01

## 2024-03-11 NOTE — REVIEW OF SYSTEMS
[Feeling Tired] : feeling tired [Shortness Of Breath] : shortness of breath [SOB on Exertion] : shortness of breath during exertion [Cough] : cough [Negative] : Heme/Lymph [Fever] : no fever [Chills] : no chills [Chest Pain] : no chest pain [Feeling Poorly] : not feeling poorly [Wheezing] : no wheezing [Palpitations] : no palpitations

## 2024-03-11 NOTE — HISTORY OF PRESENT ILLNESS
[FreeTextEntry1] : Franky Dodge is a 74-year-old male who presents for a follow up appointment. He was recently admitted to [facility] from 2/23 - 2/29. He presented for worsening dyspnea with associated cough, found to be hypoxic and admitted for COPD exacerbation. CT incidental findings of concerning for possible lung cancer.  On review of CTA Chest ordered earlier this month as well as CT Low dose lung cancer screening, there was noted mucous deposition, plugging noted in right upper lobe/bronchus; concern for possible malignancy.  CT chest on this admission showed Endobronchial lesion is noted in the right mainstem bronchus/right upper lobe bronchus. Patient treated with IV Zithromax and IV steroids. Patient status post bronchoscopy with BAL 2/28/24. Heme Onc consulted. Patient required home oxygen on discharge and was advised to have PET imaging outpatient, which he presents for today to review.  Today the patient reports to appointment today accompanied by his wife who assists in the history taking. He presents to appointment today using 2L supplemental oxygen that he uses intermittently on ambulation, exertion, and at nighttime. In addition to the shortness of breath on minimal exertion, he has a cough and fatigue. He denies chest pain, palpitations, dysphagia, nausea/vomiting, fevers, chills, unintentional weight loss or gain, and night sweats.

## 2024-03-11 NOTE — DATA REVIEWED
[FreeTextEntry1] : PETCT [facility] ONC FDG INIT performed through [facility] PROCEDURE DATE:  03/05/2024 INTERPRETATION:  CLINICAL INFORMATION: Multiple pulmonary nodules. TREATMENT STRATEGY EVALUATION: Initial FASTING BLOOD SUGAR: 182 mg/dL RADIOPHARMACEUTICAL:  12 mCi F-18, FDG, I.V. UPTAKE PERIOD: 60 minutes SCANNER: [employer] ORAL CONTRAST: Patient drank OMNIPAQUE contrast during the uptake period. PHARMACOLOGIC INTERVENTION: None.  TECHNIQUE: Following intravenous injection of radiopharmaceutical and above uptake period, PET/CT was obtained from base of skull  to mid-thigh. CT protocol was optimized for PET attenuation correction and anatomic localization and was not designed to produce and cannot replace state-of-the-art diagnostic CT images with specific imaging protocols for different body parts and indications. Images were reconstructed and reviewed in axial, coronal and sagittal views and three-dimensional MIP.  The standardized uptake values (SUV) are normalized to patient body weight and indicate the highest activity concentration (SUVmax) in a given site. All image numbers refer to axial image number.  COMPARISON:  No prior FDG-PET/CT  OTHER STUDIES USED FOR CORRELATION: CT chest 2/23/2024.  FINDINGS:  HEAD/NECK: Physiologic FDG activity in visualized brain, head, and neck.    THORAX/LUNGS: FDG avid mass extending to or originating in the right mainstem bronchus, 3.9 x 2.6 cm, SUV 13 (image 91). No other FDG avid lesions. No FDG avid lymphadenopathy that can be distinguished from the primary FDG avid mass. Right lung atelectasis. Evaluation of small nodules is limited by nonbreath-hold technique. Left chest wall AICD.  PLEURA/PERICARDIUM: No abnormal FDG activity. No effusion.  HEPATOBILIARY/PANCREAS: Physiologic FDG activity.  For reference, normal liver demonstrates SUV mean 3.1.  SPLEEN: Physiologic FDG activity. Normal in size. Splenule.  ADRENAL GLANDS: No abnormal FDG activity. No nodule. Left adreniform thickening.  KIDNEYS/URINARY BLADDER: Physiologic excreted FDG activity. Left renal cysts.  REPRODUCTIVE ORGANS: No abnormal FDG activity greater than background levels..  ABDOMINOPELVIC LYMPH NODES/RETROPERITONEUM: No enlarged or FDG-avid lymph node.  ESOPHAGUS/STOMACH/BOWEL/PERITONEUM/MESENTERY: No abnormal FDG activity. Physiological bowel activity.  VESSELS: Coronary artery and large vessel calcifications.  BONES/SOFT TISSUES: Mildly FDG avid and non-FDG avid degenerative changes in the spine. Benign musculoskeletal uptake in muscles of the upper thorax. Left inguinal hernia containing nonobstructed loops of bowel.  IMPRESSION:  1. FDG avid mass extending into/originating in the right mainstem bronchus, concerning for malignancy. No other FDG avid lesions.  --- End of Report ---  MAGI FRANCE MD; Attending Radiologist       CT CHEST performed at [facility]  PROCEDURE DATE:  02/23/2024   INTERPRETATION:  Clinical information: Shortness of breath. Exam is  compared to previous studies of 2/2/2024 as well as 10/26/2023.  CT scan of the chest was obtained without administration of intravenous  contrast.  Small low-attenuation lesion is noted in the right lobe of the thyroid  gland.  No hilar or mediastinal adenopathy is noted.  Heart is normal in size. An AICD is noted in place. No pericardial  effusion is noted.  Once again, an endobronchial lesion is noted in the right mainstem  bronchus/right upper lobe bronchus. Mucous/secretions are noted within  the right middle and right lower lobe bronchi. Resultant partial  atelectasis of the right upper and right middle lobes is noted. In  addition, tree-in-bud opacities likely representing mucoid impacted  airways are noted in the right upper lobe. Few less than 0.5 cm solid  nodules are noted in both lungs. No pleural effusions are noted.  Below the diaphragm, visualized portions of the abdomen demonstrate  thickening of the left adrenal gland. Small low-attenuation lesions in  both kidneys are too small to be adequately characterized on this exam.  Degenerative changes of the spine are noted.  IMPRESSION: Endobronchial lesion is noted in the right mainstem  bronchus/right upper lobe bronchus. Exact etiology is unclear. Primary  consideration is lung carcinoma.  --- End of Report ---  IRA VARGAS MD; Attending Radiologist This document has been electronically signed. Feb 23 2024  9:00PM

## 2024-03-11 NOTE — ASSESSMENT
[FreeTextEntry1] : Franky is a 74-year-old male with a recent hospitalization for increasing short of breath who underwent bronchoscopy and found a tumor in the proximal right mainstem bronchus that was nearly occluding the airway.  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  A PET was performed as an outpatient that demonstrated a left hilar mass invading the airway but no evidence of distant disease.  At this point I have recommended he see oncology for further evaluation and I will be arranging a bronchoscopy with argon plasma coagulator in the near future.  He is likely not a surgical candidate based on the location of the tumor and his pulmonary function.  Thank you for allowing me to participate in the care of your patient.  30 minutes was spent during this encounter.  Hunter Espitia MD Department of Cardiovascular and Thoracic Surgery  [employer] at [facility]

## 2024-03-11 NOTE — PHYSICAL EXAM
[] : no respiratory distress [Heart Rate And Rhythm] : heart rate was normal and rhythm regular [Auscultation Breath Sounds / Voice Sounds] : lungs were clear to auscultation bilaterally [Heart Sounds] : normal S1 and S2 [Heart Sounds Gallop] : no gallops [Murmurs] : no murmurs [Heart Sounds Pericardial Friction Rub] : no pericardial rub [Examination Of The Chest] : the chest was normal in appearance [Chest Visual Inspection Thoracic Asymmetry] : no chest asymmetry [Diminished Respiratory Excursion] : normal chest expansion [No Focal Deficits] : no focal deficits [Oriented To Time, Place, And Person] : oriented to person, place, and time [Impaired Insight] : insight and judgment were intact [Affect] : the affect was normal

## 2024-03-13 NOTE — CONSULT NOTE ADULT - ATTENDING COMMENTS
late entry: Patient seen and examined multiple times on March 13    74-year-old  male with history of tobacco use disorder COPD with chronic hypoxic respiratory failure heart block s/p pacemaker sustained ventricular tachycardia s/p ICD recent diagnosis of squamous cell carcinoma with endobronchial biopsy in the right mainstem while admitted for acute exacerbation of COPD with pneumonia in February 2024 presented with sudden onset worsening shortness of breath with infrequent but persistent hemoptysis for few months with saturation in the 80s on supplemental nasal cannula with worsening oxygen requirement to high flow nasal cannula with barely saturating in mid 80s as well was escalated to bilevel saturating in the low 80s subsequently had a multiple PVCs sinus tachycardia significant respiratory distress with patient breathing and 60 breaths/min requiring emergent intubation placement on invasive mechanical ventilator upon arrival to medical ICU despite of being on PEEP of 6 and FiO2 100% with tidal volume 500 and respiratory rate 20 patient was desaturating to low 80s requiring 100  milligram of rocuronium IV push, emergent A-line placed as patient started getting hypotensive requiring James-Synephrine infusion (since patient was tachycardic in 110's).  Patient's saturation improved briefly for couple of hours to more than 90% but later in the evening patient desaturated again to 30% requiring bag mask ventilation for extensive.  And new chest x-ray suggesting complete collapse of the right lung with endobronchial visualization through flexible bronchoscopy revealed diffuse inflamed mucosa with complete answer called right mainstem carcinoma.  No further intervention from bronchoscopy was undertaken.  Admitted to medical ICU with    Acute on chronic hypoxic and hypercapnic respiratory failure  Worsening squamous cell carcinoma with complete obstruction of the right mainstem  Right lung atelectasis  Postobstructive pneumonia  Distributive shock    Goals of care: Refer to separate note: DNR  Sedated with propofol and fentanyl infusion with plan to start Nimbex infusion  N.p.o., IV PPI  Low tidal volume with high PEEP as tolerated, volume assist-control, ventilator associated pneumonia prevention bundle in place including but not limited to head of the bed elevation to 30 degrees and more, daily oral care with 2% chlorhexidine, subglottic endotracheal suctioning of secretion, spontaneous awakening and breathing trial for daily assessment of mechanical ventilator liberation  MAP goal more than 65, James-Synephrine infusion, TTE with contrast as possible, low threshold to give trial of tPA, discussed with night team to discuss with family as patient could not get CT angio of the chest with poor creatinine and now unstable  DVT prophylaxis in the meantime  On broad-spectrum vancomycin Zosyn and azithromycin, blood culture urine analysis sent, RVP negative on admission  IV Solu-Medrol 40 mg every 6 hours, DuoNeb and Mucomyst around-the-clock with as needed albuterol nebs treatment  Plan was discussed with thoracic about possible plasma argon coagulation on Friday depending upon clinical course and eventual palliative chemoradiation with radiation oncology after that depending upon the clinical course  Extremely poor prognosis  Plan discussed with ICU team/wife at bedside at length multiple times

## 2024-03-13 NOTE — CONSULT NOTE ADULT - SUBJECTIVE AND OBJECTIVE BOX
CC: Dyspnea    H74 y/o M w/ PMH COPD (2L PRN at home), prior heart block (s/p PPM), prior sustained VT (s/p AICD), and recently diagnosed SCC of the lung who presented to the ED with sudden onset SOB and hypoxia. Pt was recently in the hospital for AECOPD 2/2 PNA from 2/23-2/29/2024. At that time, pt had biopsy of lung mass that resulted as SCC. Pt was treated w/ ABx ultimately discharged home with 2L home O2 w/ outpt follow up. However, this morning pt was at home at suddenly felt short of breath. He also reports hemoptysis, which has been going on since January. Denies recent illness. He denies chest pain, productive cough, f/c/n/v/d/c, abd pain, hematuria, and headache. Denies recent sick contacts and travel.    In ED, pt was hypoxic to low 80s%, requiring NC. During my encounter, pt had just completed nebulization treatment but was sustaining at 84-86% despite 5L NC, so escalated to HFNC, pt remained hypoxic w/ increased wob and was started on BiPAP but was still satting 90%. PT respiratory status degraded further requiring intubation.  CC: Dyspnea    75 y/o M w/ PMH COPD (2L PRN at home), prior heart block (s/p PPM), prior sustained VT (s/p AICD), and recently diagnosed SCC of the lung who presented to the ED with sudden onset SOB and hypoxia. Pt was recently in the hospital for AECOPD 2/2 PNA from 2/23-2/29/2024. At that time, pt had biopsy of lung mass that resulted as SCC. Pt was treated w/ ABx ultimately discharged home with 2L home O2 w/ outpt follow up. However, this morning pt was at home at suddenly felt short of breath. He also reports hemoptysis, which has been going on since January. Denies recent illness. He denies chest pain, productive cough, f/c/n/v/d/c, abd pain, hematuria, and headache. Denies recent sick contacts and travel.    In ED, pt was hypoxic to low 80s%, requiring NC. During my encounter, pt had just completed nebulization treatment but was sustaining at 84-86% despite 5L NC, so escalated to HFNC, pt remained hypoxic w/ increased wob and was started on BiPAP but was still satting 90%. PT respiratory status degraded further requiring intubation.

## 2024-03-13 NOTE — CONSULT NOTE ADULT - RESPIRATORY
use of accessory muscles/diminished breath sounds, R use of accessory muscles/diminished breath sounds, L/diminished breath sounds, R

## 2024-03-13 NOTE — H&P ADULT - ATTENDING COMMENTS
75 yo male hx of COPD on 2L O2 at night and as needed during the day, CHF s/p AICD 2016 for Vtach/PPM 2006 due to heart block, HTN, HLD presented for Shortness of breath. Patient reports that he was feeling until yesterday and then today he became increasingly short of breath. Of note patient was admitted to [facility] from 2/23 to 2/29 for COPD exacerbation and was found to have an endobronchial lesion that showed to be squamous cell carcinoma. While in ED, Patient was noted to be hypoxic and placed on 5LNC and was seen by Pulmonary. Admission to medicine was requested for further management.     PHYSICAL EXAM from initial evaluation  Vital Signs Last 24 Hrs  T(F): 98.4 (13 Mar 2024 11:25), Max: 98.4 (13 Mar 2024 11:25)  HR: 85 (13 Mar 2024 16:21) (72 - 114)  BP: 190/85 (13 Mar 2024 16:21) (107/58 - 204/91)  RR: 16 (13 Mar 2024 16:12) (16 - 38)  SpO2: 95% (13 Mar 2024 16:15) (77% - 95%)    GENERAL: Tachypneic, HFNC  Eyes: EOMI, PERRLA  ENMT: Conjunctiva and sclera clear; supple neck, No JVD  Cardiovascular: S1,S2,  tachycardia   Respiratory: Decreased breath sounds on right and clear on left   GI: Bowel sounds present; Soft, Nontender, Nondistended. No hepatomegaly  Genitourinary: Deferred  Skin:  no breakdowns, ulcers or discharge, No rashes or lesions  Neurology: Alert & Oriented X3, non-focal and spontaneous movements of all extremities, CN 2 to 12 grossly intact   Psych: Appropriate mood and affect, calm, pleasant, Responds appropriately to questions    75 yo male hx of COPD on 2L O2 at night and as needed during the day, CHF s/p AICD 2016 for Vtach/PPM 2006 due to heart block, HTN, HLD presented for Shortness of breath. Admitted for acute on chronic hypoxic respiratory failure.     #Acute on chronic hypoxic respiratory failure secondary to RLL PNA  #Squamous cell carcinoma   CXR shows RLL PNA  Tele monitoring/  CT chest is ordered  Oxygen via HFNC  Methylpred 40 q6h  Duonebs  Zosyn  Pulmonary recs appreciated  Radiation oncology consult  CT surgery consult  LE Duplex    #HTN  Can continue with losartan, hctz    #HLD  Statin    #Hx of VTACH/CHB  With AICD    DVT prophylaxis: Lovenox      Addendum: While admitting patient he started to have increased work of breathing with tachycardia and hypertension. ABG was performed. Patient was placed on BIPAP. ICU was consulted    Addendum number 2: Patient was seen and more comfortable with BIPAP. ICU resident evaluated patient and was pending ICU attending melani.     Addendum number 3: Called by Rn that patient is more tachypneic, tachycardiac, hypertensive and having increased frequency of PVCs. Went to assess patient bedside who was brought into critical area. ICU was bedside with ED team. Patient was urgently intubated and planned for transfer to ICU. I personally discussed with patients wife and updated.

## 2024-03-13 NOTE — ED PROCEDURE NOTE - CPROC ED TRACHEA INTUB TECH1
Patient here for allergy injection.  No new meds, no problems with last injection.    0.5 mL of Red 1:1 given SQ in upper Left arm.  Tolerated well.    Site checked after 30 minutes,1+ erythema and wheal noted.  No complaints voiced.     glidescope

## 2024-03-13 NOTE — ED PROVIDER NOTE - CLINICAL SUMMARY MEDICAL DECISION MAKING FREE TEXT BOX
labs and diagnostic imaging results reviewed with patient; abx and bronchodilators given; mild improvement; pulmonary assessment noted; admit

## 2024-03-13 NOTE — ED PROVIDER NOTE - OBJECTIVE STATEMENT
Patient ran out of oxygen tank last night and states suddenly became very short of breath while walking; symptoms improved with albuterol given by EMS; patient noted to be ~90% on 4L NC

## 2024-03-13 NOTE — CONSULT NOTE ADULT - ASSESSMENT
74 year old gentleman with newly diagnosed NSCLC (squamous cell carcinoma, right endobronchia), presenting to ED with dyspnea/hypoxia, with condition worsening requiring intubation and mechanical ventilation. 74 year old gentleman with newly diagnosed NSCLC (squamous cell carcinoma, right endobronchial), presenting to ED with dyspnea/hypoxia, with condition worsening requiring intubation and mechanical ventilation.

## 2024-03-13 NOTE — ED ADULT NURSE REASSESSMENT NOTE - NS ED NURSE REASSESS COMMENT FT1
Last visit 1/13/23  Next visit 6/9/23  
pt more comfortable at this time, even and unlabored resps and patient reporting relief with Bipap at this time. remains sinus tach on monitor, 02 sat improved. pt with hospitalist @ bedside for re-eval, voiding in urinal with no difficulty, IV site patent and pt upgraded to step down unit. awaiting MICU eval.
pt remains AOX3, NSR on monitor but requiring increase in supplemental 02. pt with patent IV sites, family at bedside. pt aware of need for possible admission. no chest pain, afebrile. will monitor.
pt now not tolerating Bipap, becoming diaphoretic, cyanotic, more tachypneic and hypoxic. pt with severe respiratory distress noted. IV sites patent, hospitalist contacted and patient requiring critical care. pt to be intubated for airway protection and admitted to MICU.
pt reporting increase in SOB, more hypoxic and pt not tolerating high flow cannula. pt AOX3, hospitalist contacted.
pt seen by pulmonology as well as hospitalist. pt to have stat CT as well as be placed on high flow 02 at this time. pt is awake, alert, remains hypoxic on nasal cannula as well as on treatment. pt reporting he has no chest pain or back pain.

## 2024-03-13 NOTE — H&P ADULT - HISTORY OF PRESENT ILLNESS
73 y/o M w/ PMH COPD (2L PRN at home), prior heart block (s/p PPM), prior sustained VT (s/p AICD), and recently diagnosed SCC of the lung who presented to the ED with sudden onset SOB and hypoxia. Pt was recently in the hospital for AECOPD 2/2 PNA from 2/23-2/29/2024. At that time, pt had biopsy of lung mass that resulted as SCC. Pt was treated w/ ABx ultimately discharged home with 2L home O2 w/ outpt follow up. However, this morning pt was at home at suddenly felt short of breath. He has not had any recent illness, nor does has he had any environmental exposures that he can think of (chemicals, cleaning agents, smoking). He also reports hemoptysis, which has been going on since January. He denies chest pain, productive cough, f/c/n/v/d/c, abd pain, hematuria, and headache. Denies recent sick contacts and travel.    In ED, pt was hypoxic to low 80s%, requiring NC. During my encounter, pt had just completed nebulization treatment but was sustaining at 84-86% despite 5L NC, so escalated to HFNC. CXR revealed density in RLL concerning for recurring post-obstructive pneumonia.

## 2024-03-13 NOTE — PROCEDURE NOTE - NSBRONCHPROCDETAILS_GEN_A_CORE_FT
Large bronchoscope passed through ETT to evaluate etiology of total opacification of right hemithorax. Erythematous altaf visualized. Complete occlusion of proximal right mainstem bronchus secondary to tumor burden from endobronchial mass and inflammation. No further investigation or intervention performed and bronchoscope was removed via ETT without complication.

## 2024-03-13 NOTE — ED PROVIDER NOTE - HIV OFFER
Problem: Oxygenation/Respiratory Function  Goal: Optimized air exchange  Outcome: PROGRESSING AS EXPECTED  Note:   Infant on RA with no O2 desaturations. Occasional bradycardic touchdowns; no stimulation required.      Problem: Nutrition/Feeding  Goal: Balanced Nutritional Intake  Outcome: PROGRESSING AS EXPECTED  Note:   Infant ad flash and nippling 38-53 mL of MBM/Neosure during day shift. 1 large BM. No emesis at this time.       Previously Declined (within the last year)

## 2024-03-13 NOTE — CONSULT NOTE ADULT - PROBLEM SELECTOR RECOMMENDATION 9
- Recently diagnosed.  - Discussed with Cardiothoracic Surgery; was being considered for argon plasma coagulator.  Unclear whether candidate given current condition.  - Consulted to consider radiation therapy.  - For patient not in respiratory distress, his disease appears limited based upon recent PET/CT and definitive chemoradiation may be a consideration.  - However, given his acute respiratory distress, more emergent palliative treatments may be considered.  Palliative thoracic RT to the bronchus, with goal of shrinking tumor and alleviating obstruction.  Treatment effect would not be immediate or guaranteed, and would require continued intensive care support. - Recently diagnosed.  - Discussed with Cardiothoracic Surgery; was being considered for argon plasma coagulation.  Unclear whether candidate given current condition.  - Consulted to consider radiation therapy options.  - For patients not in respiratory distress and disease limited (based upon recent PET/CT), definitive chemoradiation would have been a consideration.  - However, given his acute respiratory distress, more emergent palliative treatments may be considered.  Palliative thoracic RT to the bronchus, with goal of shrinking tumor and alleviating obstruction.  Treatment effect would not be immediate or guaranteed, and would require continued intensive care support.  Logistics may be challenging, specifically stability of patient for EMS transport to [facility].

## 2024-03-13 NOTE — H&P ADULT - ASSESSMENT
74y Male w/ PMH of COPD (2L PRN at home), prior heart block (s/p PPM), prior sustained VT (s/p AICD), and recently diagnosed SCC of the lung admitted for acute hypoxic respiratory failure 2/2 mixed etiology of post-obstructive PNA w/ AECOPD and airway-occupying lesion.    Plan  #Acute hypoxic respiratory failure  #AECOPD  #Post-obstructive PNA  -Pt initially hypoxic to low 80s%   -CXR showed evidence for recurring RLL PNA  -Procalcitonin and inflammatory markers sent  -MRSA swab sent  -Legionella urine antigen and strep antigen sent  -BCx ordered but pt already given ABx  -Will start steroids w/ IV Solumedrol 40mg Q6  -Will start Duoneb and PRN albuterol, continue home Trelegy (Symbicort and Spiriva while inpatient)  -Zosyn and vancomycin given risk factors for hospital acquired PNA, can narrow as MRSA swab results and Cx results  -Azithromycin for anti-inflammatory effects  -Supportive therapy w/ Robitussin and Tessalon  -Chest PT  -Incentive spirometry  -Encouraged ambulation and deep breathing exercises  -Monitor CBC and trend fever curve   -PRN supplemental O2, currently requiring HFNC  -Monitor on      DVT PPx:  Dispo:

## 2024-03-13 NOTE — ED PROVIDER NOTE - DIFFERENTIAL DIAGNOSIS
Differential Diagnosis electrolyte imbalance, anemia, infection electrolyte imbalance, anemia, infection, COPD exacerbation

## 2024-03-13 NOTE — ED PROVIDER NOTE - CRITICAL CARE ATTENDING CONTRIBUTION TO CARE
I, Duncan Vernon MD, spent 35 minutes of critical care time with this patient. This does not include time spent on separately reported billable procedures.

## 2024-03-13 NOTE — CONSULT NOTE ADULT - TIME BILLING
MDM
greater than 50% of time spent reviewing labs, notes, orders and radiographs, coordinating care  discussed with pt at bedside, rad Onc, T surg

## 2024-03-13 NOTE — CONSULT NOTE ADULT - SUBJECTIVE AND OBJECTIVE BOX
Patient is a 74y old  Male who presents with a chief complaint of     HPI:  75 y/o M w/ PMH COPD (2L PRN at home), prior heart block (s/p PPM), prior sustained VT (s/p AICD), and recently diagnosed SCC of the lung who presented to the ED with sudden onset SOB and hypoxia. Pt was recently in the hospital for AECOPD 2/2 PNA from 2/23-2/29/2024. At that time, pt had biopsy of lung mass that resulted as SCC. Pt was treated w/ ABx ultimately discharged home with 2L home O2 w/ outpt follow up. However, this morning pt was at home at suddenly felt short of breath. He has not had any recent illness, nor does has he had any environmental exposures that he can think of (chemicals, cleaning agents, smoking). He also reports hemoptysis, which has been going on since January. He denies chest pain, productive cough, f/c/n/v/d/c, abd pain, hematuria, and headache. Denies recent sick contacts and travel.    In ED, pt was hypoxic to low 80s%, requiring NC. During my encounter, pt had just completed nebulization treatment but was sustaining at 84-86% despite 5L NC, so escalated to HFNC. CXR revealed density in RLL concerning for recurring post-obstructive pneumonia.  (13 Mar 2024 13:23)    Recent outpatient workup included:  Accession:                             [id number]  Collected Date/Time:                   2/28/2024 21:01 EST  Specimen(s) Submitted  1. LUNG, MAINSTEM, RIGHT, BRONCHUS, BRONCHIAL BRUSH - 1  2. LUNG, MAINSTEM, RIGHT, BRONCHUS, BRONCHIAL BRUSH - 2  3. LUNG, MAINSTEM, RIGHT, BRONCHOALVEOLAR LAVAGE  Final Diagnosis  1. LUNG, MAINSTEM, RIGHT, BRONCHUS, BRONCHIAL BRUSH - 1  POSITIVE FOR MALIGNANT CELLS.  Squamous cell carcinoma.    < from: NM PET/CT Onc FDG Skull to Thigh, Inital (03.05.24 @ 13:23) >  FDG avid mass extending to or originating in the right mainstem bronchus, 3.9 x 2.6 cm, SUV 13 (image 91).  No other FDG avid lesions.  No FDG avid lymphadenopathy that can be distinguished from the primary FDG avid mass.  Right lung atelectasis.      PAST MEDICAL & SURGICAL HISTORY:  Benign essential HTN  HLD (hyperlipidemia)  Advanced COPD  Squamous cell carcinoma lung  H/O removal of cyst      FAMILY HISTORY:  FH: prostate cancer (Father)  FH: COPD (chronic obstructive pulmonary disease) (Mother)      Allergies  No Known Allergies      MEDICATIONS  (STANDING):  acetylcysteine 20%  Inhalation 4 milliLiter(s) Inhalation every 6 hours  albuterol    0.083% 2.5 milliGRAM(s) Nebulizer every 6 hours  atorvastatin 20 milliGRAM(s) Oral at bedtime  azithromycin  IVPB 500 milliGRAM(s) IV Intermittent every 24 hours  budesonide  80 MICROgram(s)/formoterol 4.5 MICROgram(s) Inhaler 2 Puff(s) Inhalation two times a day  enoxaparin Injectable 40 milliGRAM(s) SubCutaneous every 24 hours  fentaNYL   Infusion. 0.5 MICROgram(s)/kG/Hr (4.54 mL/Hr) IV Continuous <Continuous>  guaiFENesin  milliGRAM(s) Oral every 12 hours  hydrochlorothiazide 25 milliGRAM(s) Oral daily  losartan 100 milliGRAM(s) Oral daily  methylPREDNISolone sodium succinate Injectable 40 milliGRAM(s) IV Push every 6 hours  metoprolol tartrate 75 milliGRAM(s) Oral two times a day  piperacillin/tazobactam IVPB.- 3.375 Gram(s) IV Intermittent once  propofol Infusion 20 MICROgram(s)/kG/Min (10.9 mL/Hr) IV Continuous <Continuous>  tiotropium 2.5 MICROgram(s) Inhaler 2 Puff(s) Inhalation daily  vancomycin  IVPB 1250 milliGRAM(s) IV Intermittent once      PHYSICAL EXAM:  Vital Signs Last 24 Hrs  T(C): 36.9 (13 Mar 2024 11:25), Max: 36.9 (13 Mar 2024 11:25)  T(F): 98.4 (13 Mar 2024 11:25), Max: 98.4 (13 Mar 2024 11:25)  HR: 85 (13 Mar 2024 16:21) (72 - 114)  BP: 190/85 (13 Mar 2024 16:21) (107/58 - 204/91)  BP(mean): --  RR: 16 (13 Mar 2024 16:12) (16 - 38)  SpO2: 95% (13 Mar 2024 16:15) (77% - 95%)    Parameters below as of 13 Mar 2024 16:12  Patient On (Oxygen Delivery Method): ventilator      IMAGING/LABS/PATHOLOGY: I have personally reviewed the relevant labs, pathology, and imaging as noted in the HPI.  In addition,                        15.5   8.95  )-----------( 108      ( 13 Mar 2024 14:50 )             42.4     03-13    134<L>  |  98  |  40.8<H>  ----------------------------<  146<H>  4.8   |  23.0  |  1.38<H>    Ca    9.4      13 Mar 2024 08:15         Patient is a 74y old  Male who presents with a chief complaint of dyspnea.    HPI:  75 y/o M w/ PMH COPD (2L PRN at home), prior heart block (s/p PPM), prior sustained VT (s/p AICD), and recently diagnosed SCC of the lung who presented to the ED with sudden onset SOB and hypoxia. Pt was recently in the hospital for AECOPD 2/2 PNA from 2/23-2/29/2024. At that time, pt had biopsy of lung mass that resulted as SCC. Pt was treated w/ ABx ultimately discharged home with 2L home O2 w/ outpt follow up. However, this morning pt was at home at suddenly felt short of breath. He has not had any recent illness, nor does has he had any environmental exposures that he can think of (chemicals, cleaning agents, smoking). He also reports hemoptysis, which has been going on since January. He denies chest pain, productive cough, f/c/n/v/d/c, abd pain, hematuria, and headache. Denies recent sick contacts and travel.    In ED, pt was hypoxic to low 80s%, requiring NC. During my encounter, pt had just completed nebulization treatment but was sustaining at 84-86% despite 5L NC, so escalated to HFNC. CXR revealed density in RLL concerning for recurring post-obstructive pneumonia.  (13 Mar 2024 13:23)    Recent outpatient workup included:  Accession:                             [id number]  Collected Date/Time:                   2/28/2024 21:01 EST  Specimen(s) Submitted  1. LUNG, MAINSTEM, RIGHT, BRONCHUS, BRONCHIAL BRUSH - 1  2. LUNG, MAINSTEM, RIGHT, BRONCHUS, BRONCHIAL BRUSH - 2  3. LUNG, MAINSTEM, RIGHT, BRONCHOALVEOLAR LAVAGE  Final Diagnosis  1. LUNG, MAINSTEM, RIGHT, BRONCHUS, BRONCHIAL BRUSH - 1  POSITIVE FOR MALIGNANT CELLS.  Squamous cell carcinoma.    < from: NM PET/CT Onc FDG Skull to Thigh, Inital (03.05.24 @ 13:23) >  FDG avid mass extending to or originating in the right mainstem bronchus, 3.9 x 2.6 cm, SUV 13 (image 91).  No other FDG avid lesions.  No FDG avid lymphadenopathy that can be distinguished from the primary FDG avid mass.  Right lung atelectasis.      PAST MEDICAL & SURGICAL HISTORY:  Benign essential HTN  HLD (hyperlipidemia)  Advanced COPD  Squamous cell carcinoma lung  H/O removal of cyst      FAMILY HISTORY:  FH: prostate cancer (Father)  FH: COPD (chronic obstructive pulmonary disease) (Mother)      Allergies  No Known Allergies      MEDICATIONS  (STANDING):  acetylcysteine 20%  Inhalation 4 milliLiter(s) Inhalation every 6 hours  albuterol    0.083% 2.5 milliGRAM(s) Nebulizer every 6 hours  atorvastatin 20 milliGRAM(s) Oral at bedtime  azithromycin  IVPB 500 milliGRAM(s) IV Intermittent every 24 hours  budesonide  80 MICROgram(s)/formoterol 4.5 MICROgram(s) Inhaler 2 Puff(s) Inhalation two times a day  enoxaparin Injectable 40 milliGRAM(s) SubCutaneous every 24 hours  fentaNYL   Infusion. 0.5 MICROgram(s)/kG/Hr (4.54 mL/Hr) IV Continuous <Continuous>  guaiFENesin  milliGRAM(s) Oral every 12 hours  hydrochlorothiazide 25 milliGRAM(s) Oral daily  losartan 100 milliGRAM(s) Oral daily  methylPREDNISolone sodium succinate Injectable 40 milliGRAM(s) IV Push every 6 hours  metoprolol tartrate 75 milliGRAM(s) Oral two times a day  piperacillin/tazobactam IVPB.- 3.375 Gram(s) IV Intermittent once  propofol Infusion 20 MICROgram(s)/kG/Min (10.9 mL/Hr) IV Continuous <Continuous>  tiotropium 2.5 MICROgram(s) Inhaler 2 Puff(s) Inhalation daily  vancomycin  IVPB 1250 milliGRAM(s) IV Intermittent once      PHYSICAL EXAM:  Vital Signs Last 24 Hrs  T(C): 36.9 (13 Mar 2024 11:25), Max: 36.9 (13 Mar 2024 11:25)  T(F): 98.4 (13 Mar 2024 11:25), Max: 98.4 (13 Mar 2024 11:25)  HR: 85 (13 Mar 2024 16:21) (72 - 114)  BP: 190/85 (13 Mar 2024 16:21) (107/58 - 204/91)  BP(mean): --  RR: 16 (13 Mar 2024 16:12) (16 - 38)  SpO2: 95% (13 Mar 2024 16:15) (77% - 95%)    Parameters below as of 13 Mar 2024 16:12  Patient On (Oxygen Delivery Method): ventilator      IMAGING/LABS/PATHOLOGY: I have personally reviewed the relevant labs, pathology, and imaging as noted in the HPI.  In addition,                        15.5   8.95  )-----------( 108      ( 13 Mar 2024 14:50 )             42.4     03-13    134<L>  |  98  |  40.8<H>  ----------------------------<  146<H>  4.8   |  23.0  |  1.38<H>    Ca    9.4      13 Mar 2024 08:15         Patient is a 74y old  Male who presents with a chief complaint of dyspnea.    HPI:  73 y/o M w/ PMH COPD (2L PRN at home), prior heart block (s/p PPM), prior sustained VT (s/p AICD), and recently diagnosed SCC of the lung who presented to the ED with sudden onset SOB and hypoxia. Pt was recently in the hospital for AECOPD 2/2 PNA from 2/23-2/29/2024. At that time, pt had biopsy of lung mass that resulted as SCC. Pt was treated w/ ABx ultimately discharged home with 2L home O2 w/ outpt follow up. However, this morning pt was at home at suddenly felt short of breath. He has not had any recent illness, nor does has he had any environmental exposures that he can think of (chemicals, cleaning agents, smoking). He also reports hemoptysis, which has been going on since January. He denies chest pain, productive cough, f/c/n/v/d/c, abd pain, hematuria, and headache. Denies recent sick contacts and travel.    In ED, pt was hypoxic to low 80s%, requiring NC. During my encounter, pt had just completed nebulization treatment but was sustaining at 84-86% despite 5L NC, so escalated to HFNC. CXR revealed density in RLL concerning for recurring post-obstructive pneumonia.  (13 Mar 2024 13:23)    Condition worsening, requiring intubation and mechanical ventilation.  Transferred to MICU, vented and sedated.    Recent outpatient workup included:  Accession:                             [id number]  Collected Date/Time:                   2/28/2024 21:01 EST  Specimen(s) Submitted  1. LUNG, MAINSTEM, RIGHT, BRONCHUS, BRONCHIAL BRUSH - 1  2. LUNG, MAINSTEM, RIGHT, BRONCHUS, BRONCHIAL BRUSH - 2  3. LUNG, MAINSTEM, RIGHT, BRONCHOALVEOLAR LAVAGE  Final Diagnosis  1. LUNG, MAINSTEM, RIGHT, BRONCHUS, BRONCHIAL BRUSH - 1  POSITIVE FOR MALIGNANT CELLS.  Squamous cell carcinoma.    < from: NM PET/CT Onc FDG Skull to Thigh, Inital (03.05.24 @ 13:23) >  FDG avid mass extending to or originating in the right mainstem bronchus, 3.9 x 2.6 cm, SUV 13 (image 91).  No other FDG avid lesions.  No FDG avid lymphadenopathy that can be distinguished from the primary FDG avid mass.  Right lung atelectasis.      PAST MEDICAL & SURGICAL HISTORY:  Benign essential HTN  HLD (hyperlipidemia)  Advanced COPD  Squamous cell carcinoma lung  H/O removal of cyst      FAMILY HISTORY:  FH: prostate cancer (Father)  FH: COPD (chronic obstructive pulmonary disease) (Mother)      Allergies  No Known Allergies      MEDICATIONS  (STANDING):  acetylcysteine 20%  Inhalation 4 milliLiter(s) Inhalation every 6 hours  albuterol    0.083% 2.5 milliGRAM(s) Nebulizer every 6 hours  atorvastatin 20 milliGRAM(s) Oral at bedtime  azithromycin  IVPB 500 milliGRAM(s) IV Intermittent every 24 hours  budesonide  80 MICROgram(s)/formoterol 4.5 MICROgram(s) Inhaler 2 Puff(s) Inhalation two times a day  enoxaparin Injectable 40 milliGRAM(s) SubCutaneous every 24 hours  fentaNYL   Infusion. 0.5 MICROgram(s)/kG/Hr (4.54 mL/Hr) IV Continuous <Continuous>  guaiFENesin  milliGRAM(s) Oral every 12 hours  hydrochlorothiazide 25 milliGRAM(s) Oral daily  losartan 100 milliGRAM(s) Oral daily  methylPREDNISolone sodium succinate Injectable 40 milliGRAM(s) IV Push every 6 hours  metoprolol tartrate 75 milliGRAM(s) Oral two times a day  piperacillin/tazobactam IVPB.- 3.375 Gram(s) IV Intermittent once  propofol Infusion 20 MICROgram(s)/kG/Min (10.9 mL/Hr) IV Continuous <Continuous>  tiotropium 2.5 MICROgram(s) Inhaler 2 Puff(s) Inhalation daily  vancomycin  IVPB 1250 milliGRAM(s) IV Intermittent once      PHYSICAL EXAM:  Vital Signs Last 24 Hrs  T(C): 36.9 (13 Mar 2024 11:25), Max: 36.9 (13 Mar 2024 11:25)  T(F): 98.4 (13 Mar 2024 11:25), Max: 98.4 (13 Mar 2024 11:25)  HR: 85 (13 Mar 2024 16:21) (72 - 114)  BP: 190/85 (13 Mar 2024 16:21) (107/58 - 204/91)  BP(mean): --  RR: 16 (13 Mar 2024 16:12) (16 - 38)  SpO2: 95% (13 Mar 2024 16:15) (77% - 95%)    Parameters below as of 13 Mar 2024 16:12  Patient On (Oxygen Delivery Method): ventilator      IMAGING/LABS/PATHOLOGY: I have personally reviewed the relevant labs, pathology, and imaging as noted in the HPI.  In addition,                        15.5   8.95  )-----------( 108      ( 13 Mar 2024 14:50 )             42.4     03-13    134<L>  |  98  |  40.8<H>  ----------------------------<  146<H>  4.8   |  23.0  |  1.38<H>    Ca    9.4      13 Mar 2024 08:15

## 2024-03-13 NOTE — CONSULT NOTE ADULT - ASSESSMENT
Acute on chronic hypoxic respiratory failure 2/2 COPD exacerbation/RLL pna/right bronchial SCC  Afebrile, hypertensive, no leukocytosis   Chest x-ray showing RLL PNA  Hypoxia due to V/Q mismatch from obstructing mass, possible infection   Recommend ABG, procal, RVP, BCx, legionella, fungitel, CT chest, chest PT, mucinex, azithromycin, duoneb, solumedrol  Recommend consulting heme-onc     Acute on chronic hypoxic respiratory failure  Underlying COPD, home 02 depd  Aggressive  endobronchial malignancy Squamous cell  Incr Volume loss on CXR , favor worsening obstruction/post ob atelectasis /pna with COPD  Worsening 02 requirement, hemoptysis, dyspnea,Creatine elevated  CT scan, duplex, ABG  HFNC  Empiric abx for post obstructive pna  Radiation oncology called for RT  Spoke with Dr Nupur franks laser  Telemetry monitoring ICU if worsens               Acute on chronic hypoxic respiratory failure  Underlying COPD, home 02 depd  Aggressive  endobronchial malignancy Squamous cell  Incr Volume loss on CXR , favor worsening obstruction/post ob atelectasis /pna with COPD  Worsening 02 requirement, hemoptysis, dyspnea,Creatine elevated  CT scan, duplex, ABG  HFNC  medrol, nebs  Empiric abx for post obstructive pna  Radiation oncology called for RT  Spoke with Dr Nupur franks laser  Telemetry monitoring ICU if worsens

## 2024-03-13 NOTE — ED ADULT TRIAGE NOTE - CHIEF COMPLAINT QUOTE
PT woke up this morning with SOB after ambulating. PT checked Spo2 and noticed it was 70%. Normally wears 2 L O2. Received one neb with EMS and now feels relief. Denies chest pain or fevers. Hx od COPD and Lung CA.

## 2024-03-13 NOTE — CONSULT NOTE ADULT - CONSULT REQUESTED BY NAME
Patient Education     Allergic Rhinitis  Allergic rhinitis is an allergic reaction that affects the nose, and often the eyes. It’s often known as nasal allergies. Nasal allergies are often due to things in the environment that are breathed in. Depending what you are sensitive to, nasal allergies may occur only during certain seasons, or they may occur year round. Common indoor allergens include house dust mites, mold, cockroaches, and pet dander. Outdoor allergens include pollen from trees, grasses, and weeds.    Symptoms include a drippy, stuffy, and itchy nose. They also include sneezing and red and itchy eyes. You may feel tired more often. Severe allergies may also affect your breathing and trigger a condition called asthma.    Tests can be done to see what allergens are affecting you. You may be referred to an allergy specialist for testing and further evaluation.   Home care  Your healthcare provider may prescribe medicines to help relieve allergy symptoms. These may include oral medicines, nasal sprays, or eye drops.   Ask your provider for advice on how to stay away from substances that you are allergic to. Below are a few tips for each type of allergen.   Pet dander:  Do not have pets with fur and feathers.  If you have a pet, keep it out of your bedroom and off upholstered furniture.  Pollen:  When pollen counts are high, keep windows of your car and home closed. If possible, use an air conditioner instead.  Wear a filter mask when mowing or doing yard work.  House dust mites:  Wash bedding every week in warm water and detergent and dry on a hot setting.  Cover the mattress, box spring, and pillows with allergy covers.   If possible, sleep in a room with no carpet, curtains, or upholstered furniture.  Cockroaches:  Store food in sealed containers.  Remove garbage from the home promptly.  Fix water leaks.  Mold:  Keep humidity low by using a dehumidifier or air conditioner. Keep the dehumidifier and air 
conditioner clean and free of mold.  Clean moldy areas with bleach and water. Don't mix bleach with other .  In general:  Vacuum once or twice a week. If possible, use a vacuum with a high-efficiency particulate air (HEPA) filter.  Don't smoke. Stay away from cigarette smoke. Cigarette smoke is an irritant that can make symptoms worse.  Follow-up care  Follow up as advised by the healthcare provider or our staff. If you were referred to an allergy specialist, make this appointment promptly.   When to seek medical advice  Call your healthcare provider or get medical care right away if the following occur:   Coughing  Fever of 100.4°F (38°C) or higher, or as directed by your healthcare provider  Raised red bumps (hives)  Continuing symptoms, new symptoms, or worsening symptoms  Call 911  Call 911 if you have:   Trouble breathing  Severe swelling of the face or severe itching of the eyes or mouth  Wheezing or shortness of breath  Chest tightness  Dizziness or lightheadedness  Feeling of doom  Stomach pain, bloating, vomiting, or diarrhea  [employer] last reviewed this educational content on 10/1/2019  © 2008-8558 [employer]. All rights reserved. This information is not intended as a substitute for professional medical care. Always follow your healthcare professional's instructions.           
Rufino Bateman
Dr. Vernon
Ye Soto

## 2024-03-13 NOTE — ED CLERICAL - NS ED CLERK NOTE PRE-ARRIVAL INFORMATION; ADDITIONAL PRE-ARRIVAL INFORMATION
This patient is enrolled in the [facility] readmission reduction program and has active care navigation. This patient can be followed up by the care navigation team within 24 hours. To arrange close follow-up or to obtain additional clinical information about this patient, please call the contact number above. Please speak with the [facility] ED Case Manager for assistance with discharge planning

## 2024-03-13 NOTE — ED ADULT NURSE NOTE - OBJECTIVE STATEMENT
pt BIBA from home with reports of sudden onset increased SOB since this morning. states last week was dx with lung cancer, right lung lesion found and biopsied, follows with Dr. Espitia (CT surg). pt states he uses 02 at night, but has been using during the day PRN. pt reports he has been needing to use it since this morning continuously. pt is AOX3, afebrile, denies any fevers.  was also being treated for PNA 2 months ago.

## 2024-03-13 NOTE — H&P ADULT - NSHPLABSRESULTS_GEN_ALL_CORE
14.5   9.09  )-----------( 89       ( 13 Mar 2024 08:15 )             41.3     03-13    134<L>  |  98  |  40.8<H>  ----------------------------<  146<H>  4.8   |  23.0  |  1.38<H>    Ca    9.4      13 Mar 2024 08:15      < from: Xray Chest 1 View-PORTABLE IMMEDIATE (Xray Chest 1 View-PORTABLE IMMEDIATE .) (03.13.24 @ 09:44) >    IMPRESSION: Increasing right base infiltrate.    < end of copied text >

## 2024-03-13 NOTE — PROGRESS NOTE ADULT - PROBLEM SELECTOR PLAN 2
Plan for Bronchoscopy with argon plasma coagulation on Friday with Dr Espitia   Most likely will need radiation to shrink tumor  Radiation oncology on board

## 2024-03-13 NOTE — CONSULT NOTE ADULT - SUBJECTIVE AND OBJECTIVE BOX
PULMONARY CONSULT NOTE      CANDACE [id number]    Patient is a 74y old  Male who presents with a chief complaint of SOB    HISTORY OF PRESENT ILLNESS:  73 yo male, hx of COPD on 2L O2 at night and as needed during the day, CHF s/p AICD 2016 for Vtach/PPM 2006 due to heart block, HTN, HLD. BIBEMS to [facility] ED from home, for SOB. This morning, SPO2 sat in the 70s, chest tightness, hemoptysis, unable to catch his breath. Says he has had hemoptysis, a few specs on a tissue a few times a day, since most recent admission. Patient was admitted from 2/23-2/29 at [facility] for COPD exacerbation. CTA Chest ordered 2/2/24 as well as CT Low dose lung cancer screening, there was noted mucous deposition, plugging noted in right upper lobe/bronchus; concern for possible malignancy. During admission found an Endobronchial lesion on right mainstem bronchus/right upper lobe bronchus on 3/23/24.  Patient treated with IV Zithromax and IV steroids. Patient underwent bronchoscopy with BAL and biopsy 2/28/24, found squamous cell carcinoma. PT NM CT OT wholebody on 3/5/24 found a mass extending in the right mainstem bronchus, concerning for malignancy. Patient followed up with Dr. Espitia outpatient, recommended referral to heme/onc for chemotherapy, has an appointment 3/18, no surgical intervention recommended at this time due to location and size.   While in the ED patient placed on 5L NC w/ SpO2 in high 80s, Duoneb and solumedrol. Patient is currently anxious but denies chest pain, palpitations, dysphagia, nausea/vomiting, fevers, chills, unintentional weight loss or gain, and night sweats.      MEDICATIONS  (STANDING):  home: albuterol, Trelegy     MEDICATIONS  (PRN):      Allergies    No Known Allergies    Intolerances        PAST MEDICAL & SURGICAL HISTORY:  Chronic systolic congestive heart failure      Benign essential HTN      HLD (hyperlipidemia)      Advanced COPD      H/O removal of cyst          FAMILY HISTORY:  FH: prostate cancer (Father)    FH: COPD (chronic obstructive pulmonary disease) (Mother)        SOCIAL HISTORY  Smoking History: quit 10 yrs ago     REVIEW OF SYSTEMS:    CONSTITUTIONAL:  No fevers, chills, sweats    HEENT:  Eyes:  No diplopia or blurred vision. ENT:  No earache, sore throat or runny nose.    CARDIOVASCULAR:  No pressure, squeezing, tightness, or heaviness about the chest; no palpitations.    RESPIRATORY:  per HPI    GASTROINTESTINAL:  No abdominal pain, nausea, vomiting or diarrhea.    GENITOURINARY:  No dysuria, frequency or urgency.    NEUROLOGIC:  No paresthesias, fasciculations, seizures or weakness.    PSYCHIATRIC:  No disorder of thought or mood.    Vital Signs Last 24 Hrs  T(C): 36.7 (13 Mar 2024 07:30), Max: 36.7 (13 Mar 2024 07:30)  T(F): 98 (13 Mar 2024 07:30), Max: 98 (13 Mar 2024 07:30)  HR: 72 (13 Mar 2024 07:30) (72 - 72)  BP: 159/87 (13 Mar 2024 07:30) (159/87 - 159/87)  BP(mean): --  RR: 20 (13 Mar 2024 07:30) (20 - 20)  SpO2: 93% (13 Mar 2024 07:30) (93% - 93%)    Parameters below as of 13 Mar 2024 07:30  Patient On (Oxygen Delivery Method): nasal cannula  O2 Flow (L/min): 3      PHYSICAL EXAMINATION:    GENERAL: The patient is a well-developed, well-nourished laying in bed on NC in no apparent distress.     HEENT: Head is normocephalic and atraumatic. Extraocular muscles are intact. Mucous membranes are moist.     NECK: Supple.     LUNGS: Clear to auscultation without wheezing, rales, or rhonchi. Respirations unlabored    HEART: Regular rate and rhythm without murmur.    ABDOMEN: Soft, nontender, and nondistended.  No hepatosplenomegaly is noted.    EXTREMITIES: Without any cyanosis, clubbing, rash, lesions or edema.    NEUROLOGIC: Grossly intact.      LABS:                        14.5   9.09  )-----------( 89       ( 13 Mar 2024 08:15 )             41.3     03-13    134<L>  |  98  |  40.8<H>  ----------------------------<  146<H>  4.8   |  23.0  |  1.38<H>    Ca    9.4      13 Mar 2024 08:15    RADIOLOGY & ADDITIONAL STUDIES:  Imaging reviewed

## 2024-03-13 NOTE — H&P ADULT - NSICDXPASTMEDICALHX_GEN_ALL_CORE_FT
PAST MEDICAL HISTORY:  Advanced COPD     Benign essential HTN     HLD (hyperlipidemia)     Squamous cell carcinoma lung

## 2024-03-13 NOTE — H&P ADULT - NSHPPHYSICALEXAM_GEN_ALL_CORE
GENERAL: visibly anxious and tremulous  HEENT: Atraumatic, normocephalic, non-icteric, no JVD  NEURO:  A&Ox3, no focal deficits, moving all extremities spontaneously, no dysarthria, CN II-XII grossly intact  PSYCH: Normal affect, calm, appropriate insight and judgment, fluent speech  LUNGS: Decreased breath sounds over entire right lung, increased work of breathing with accessory muscle use  HEART: RRR, no murmur appreciated  ABD: Soft, non-tender, non-distended, no organomegaly, no appreciable masses, +bs all 4 quadrants  EXTREMITIES: Nontender, no clubbing, cyanosis, or edema  SKIN: No rashes or lesions

## 2024-03-13 NOTE — CDI QUERY NOTE - NSCDIOTHERTXTBX_GEN_ALL_CORE_HH
The operative note on 2/28/24 includes "...Multiple brushings were taken from the lesion and some bleeding occurred.  This was controlled with dilute epinephrine..." Clarification is needed to determine:    -Intraoperative complication of bleeding requiring control with dilute epinephrine, a significant intervention  -Intraoperative bleeding not considered a complication/insignificant intervention  -Other    SUPPORTING DOCUMENTATION    -Operative Note: ... PROCEDURE PERFORMED:  1.Flexible bronchoscopy. 2.Bronchial lavage. 3.Endobronchial brushing... flexible bronchoscopy was performed.  This demonstrated large mass largely occluding the right mainstem bronchus.  It appeared to be passage adjacent to the mass and aeration of the middle and lower lobe was present.  Multiple brushings were taken from the lesion and some bleeding occurred.  This was controlled with dilute epinephrine...

## 2024-03-13 NOTE — ED ADULT NURSE NOTE - NSFALLUNIVINTERV_ED_ALL_ED
Bed/Stretcher in lowest position, wheels locked, appropriate side rails in place/Call bell, personal items and telephone in reach/Instruct patient to call for assistance before getting out of bed/chair/stretcher/Non-slip footwear applied when patient is off stretcher/Breaks to call system/Physically safe environment - no spills, clutter or unnecessary equipment/Purposeful proactive rounding/Room/bathroom lighting operational, light cord in reach

## 2024-03-13 NOTE — ED PROVIDER NOTE - CARE PLAN
Principal Discharge DX:	Pneumonia   1 Principal Discharge DX:	Acute on chronic respiratory failure with hypoxemia  Secondary Diagnosis:	Endobronchial cancer  Secondary Diagnosis:	COPD, moderate

## 2024-03-13 NOTE — CONSULT NOTE ADULT - ASSESSMENT
Incomplete 73 y/o M w/ PMH COPD (2L PRN at home), prior heart block (s/p PPM), prior sustained VT (s/p AICD), and recently diagnosed SCC of the lung who presented to the ED with sudden onset SOB and hypoxia. PT decompensated in the ED requiring intubation.     PLAN:   (1) Acute hypoxic respiratory failure  -Intubated and mechanically ventilated.   - Wean as tolerated.   -SBT/SAT in AM.     (2) Endobronchial mass/broncial obstruction  -CT surgery consult  -Chest PT      (3) Pneumonia  CXR showed evidence for recurring RLL PNA  -Zosyn and vancomycin given risk factors for hospital acquired PNA, can narrow as MRSA swab results and Cx results  -Azithromycin for anti-inflammatory effects  -Procalcitonin and inflammatory markers sent  -MRSA swab sent  -Legionella urine antigen and strep antigen sent  -BCx ordered but pt already given ABx  -Monitor CBC and trend fever curve     (4) COPD  -Will start steroids w/ IV Solumedrol 40mg Q6  -Will start Duoneb and PRN albuterol, continue home Trelegy (Symbicort and Spiriva while inpatient)

## 2024-03-13 NOTE — PROGRESS NOTE ADULT - PROBLEM SELECTOR PLAN 1
Currently vented and sedated  Vent management as per MICU team  suggest CTA chest to look for PE when stable if remains hypoxic.  Case discussed with Dr Espitia

## 2024-03-13 NOTE — ED PROVIDER NOTE - CONSIDERATION OF ADMISSION OBSERVATION
acute change in CXR findings; worsening hypoxia, pulmonary assessment Consideration of Admission/Observation

## 2024-03-14 NOTE — GOALS OF CARE CONVERSATION - ADVANCED CARE PLANNING - CONVERSATION DETAILS
PT w/ grim prognosis 2/2 acute hypoxemic respiratory failure despite maximum respiratory support. PT wife informed of poor prognosis and pending cardiovascular collapse 2/2 hypoxia. Wife states she does not wish for her  to suffer. She does not want chest compressions if his heart stops. MOLST filled out reflecting wife's wishes.
Patient remained severely hypoxic throughout the night with saturation in low 70s throughout the night with worsening shock state with increased requirement of James-Synephrine infusion with renal failure with worsening hyperkalemia and metabolic acidosis with thrombocytopenia induction patient is in worsening multiorgan dysfunction and we discussed patient's prognosis based on his progression in the ICU to patient's wife at bedside and patient's son on the phone.  Based on the prognosis and current progression, patient would not want to suffer anymore as per family and transitioning goals of care to comfort measures only.  DNR  DNI  Starting on Dilaudid at 2 mg/h to be titrated for comfort measures  Starting on Ativan 2 mg every 1 hour as needed the first dose to be given prior to discontinuing propofol and fentanyl infusion  Stopping all labs imaging  Stopping paralytic infusion  Stopping antibiotics/anticoagulation/steroids/nebulized treatment  Offer chaplaincy services which patient's wife does not think that he would want to see  Awaiting patient's son prior to comfort extubation and stopping James-Synephrine infusion

## 2024-03-14 NOTE — DISCHARGE NOTE FOR THE EXPIRED PATIENT - HOSPITAL COURSE
74-year-old  male with history of tobacco use disorder COPD with chronic hypoxic respiratory failure heart block s/p pacemaker sustained ventricular tachycardia s/p ICD recent diagnosis of squamous cell carcinoma with endobronchial biopsy in the right mainstem while admitted for acute exacerbation of COPD with pneumonia in 2024 presented with sudden onset worsening shortness of breath with infrequent but persistent hemoptysis for few months Failing treatment with nasal cannula high flow nasal cannula bilevel with worsening respiratory distress and hypoxia with tachycardia requiring oral intubation and placement on  mechanical ventilation with subsequent distributive shock requiring IV vasoactive agents while being treated with IV Solu-Medrol, IV broad-spectrum antibiotics, inhaled nebulized treatment as bronchodilators with further ICU course complicated by persistent worsening hypoxia requiring bag mask ventilation despite of various changes made in the ventilator settings with high PEEP high FiO2 low tidal volume versus high tidal volume high respiratory rate and despite of all these efforts as well as trial of 50 mg of IV tPA with presumed pulmonary embolism (could not obtain good imaging on TTE as well as could not obtain CT angio as patient was too unstable with elevated creatinine) with subsequent worsening multiorgan dysfunction later this morning hence goals of care was conducted with patient's family including wife and son and based on patient's wishes he would not want to suffer understanding his multiorgan failure and prognosis hence transition to comfort measures only and patient  comfortably at 11:05  AM on 2024 with family at bedside.

## 2024-03-14 NOTE — PROGRESS NOTE ADULT - ASSESSMENT
Incomplete
73 yo male, hx of COPD not on home O2, CHF s/p AICD/PPM, HTN, HLD presented to [facility] ED c/o SOB and cough.  Pt reports since January he has been experiencing SOB with an associated cough, pt followed up with his pulmonologist and was started on Prednisone & ABx. He reports his symptoms had mildly improved initially but after 2-3 weeks still had a persistent cough & SOB. Pt followed up with pulm and was given another round of steroids & Abx. Pt found to have elevated d-dimer by pulm, was sent for CTA which was negative for PE but noted right upper bronchus plugging. Pt reports over the past week he has noticed that his symptoms had not been improving with persistent shortness of breath doing daily activities and a persistent cough, pt decided to come to the ED for evaluation. CT chest in ED revealed an endobronchial lesion in the right mainstem. Thoracic surgery consulted for bronch/biopsy. S/p bronch 2/28., Which was positive for NSCLC (squamous cell carcinoma, right endobronchial). 3/13 He presented to ED with dyspnea/hypoxia, with condition worsening requiring intubation and mechanical ventilation.  Patient seen in MICU vented and sedated.  Radiation oncology consulted

## 2024-03-14 NOTE — PROGRESS NOTE ADULT - SUBJECTIVE AND OBJECTIVE BOX
75 y/o M with a h/o COPD (2L O2 PRN at home), prior heart block (s/p PPM), VT (s/p AICD), recently diagnosed SCC of the lung, admitted earlier with complaints of sudden onset dyspnea and hypoxemia. He also endorsed hemoptysis which has been ongoing since January. Brief hospital course complicated by rapidly progressing hypoxemic respiratory failure which ultimately required intubation. Initial CT-imaging revealed known right lung mass with endobronchial component as proximal as the right main bronchus and also involving the right hilum with unchanged post obstructive atelectasis and distal airway impaction in the right upper lobe. Also revealed increased mucous plugging within the middle and right lower lobar bronchi with unchanged middle lobar atelectasis and increased obstructive atelectasis in the right lower lobe.     Shortly after intubation patient's SpO2 plummeted to 60s/70s. Stat CXR revealed total opacification of right hemithorax. SpO2 unchanged despite aggressive bag-mask ventilation with PEEP valve and paralysis with IV rocuronium bolus followed by cisatracurium infusion. Urgent bronchoscopy performed with direct visualization of completely occluded right mainstem bronchus secondary to tumor burden. Case discussed with thoracic surgery PA, Sandi, who was in contact with thoracic surgeon, Dr. Espitia. Their recommendation was to treat empirically with thrombolytic given increased suspicion that an underlying PE could be contributing to the profound hypoxemia. No plan for any other intervention. This option was discussed with the patient's wife and she agreed with treatment. 50mg IV TPA was administered without any effect on SpO2. Subsequently started on heparin infusion after aPTT resulted as 37. Will continue ATC inhaled bronchodilators, mucolytic, and IV steroids. Deep sedation with propofol and fentanyl infusions.    Patient developed distributive shock during the course of his acute decompensation and was started on phenylephrine infusion. Actively titrating this to maintain a MAP > 65. Continue empiric Zosyn, vancomycin, and azithromycin for now.    Patient's wife and son are at the bedside. Diagnoses and management plan outlined. All questions answered and concerns addressed. They understand that his prognosis is unfortunately grim as we are unable to oxygenate Mr. Dodge despite exhaustive efforts. They are okay with continuing a trial of aggressive medical therapy, however have elected to instate a DNR advanced directive.    Case discussed with MICU physicians, Dr. Sykes and Dr. Pitts.      CRITICAL CARE TIME SPENT: 90 mins  Time spent evaluating/treating patient with medical issues that pose a high risk for life threatening deterioration and/or end-organ damage, reviewing data/labs/imaging, discussing case with multidisciplinary team, discussing plan/goals of care with patient/family. Non-inclusive of procedure time. The date of entry of this note reflects the date of services rendered.    
75 y/o M with a h/o COPD (2L O2 PRN at home), prior heart block (s/p PPM), VT (s/p AICD), recently diagnosed SCC of the lung, admitted earlier with complaints of sudden onset dyspnea and hypoxemia. He also endorsed hemoptysis which has been ongoing since January. Brief hospital course complicated by rapidly progressing hypoxemic respiratory failure which ultimately required intubation. Initial CT-imaging revealed known right lung mass with endobronchial component as proximal as the right main bronchus and also involving the right hilum with unchanged post obstructive atelectasis and distal airway impaction in the right upper lobe. Also revealed increased mucous plugging within the middle and right lower lobar bronchi with unchanged middle lobar atelectasis and increased obstructive atelectasis in the right lower lobe.     Shortly after intubation patient's SpO2 plummeted to 60s/70s. Stat CXR revealed total opacification of right hemithorax. SpO2 unchanged despite aggressive bag-mask ventilation with PEEP valve and paralysis with IV rocuronium bolus followed by cisatracurium infusion. Urgent bronchoscopy performed with direct visualization of completely occluded right mainstem bronchus secondary to tumor burden. Case discussed with thoracic surgery PA, Sandi, who was in contact with thoracic surgeon, Dr. Espitia. Their recommendation was to treat empirically with thrombolytic given increased suspicion that an underlying PE could be contributing to the profound hypoxemia. No plan for any other intervention. This option was discussed with the patient's wife and she agreed with treatment. 50mg IV TPA was administered without any effect on SpO2. Subsequently started on heparin infusion after aPTT resulted as 37.     Patient developed distributive shock during the course of his acute decompensation and was started on phenylephrine infusion.            
Patient seen and examined.  Known to thoracic surgery.  Had Bronchoscopy with biopsy of endobronchial lesion on 2/28/24 with Dr Espitia +NSCLC (squamous cell carcinoma, right endobronchial) He presented to ED with dyspnea/hypoxia, with condition worsening requiring intubation and mechanical ventilation.  Patient seen in MICU vented and sedated.     T(C): 36.9 (03-13-24 @ 11:25)  T(F): 98.4 (03-13-24 @ 11:25)  HR: 115 (03-13-24 @ 17:17)  BP: 122/93 (03-13-24 @ 17:00)  BP(mean): 102 (03-13-24 @ 17:00)    RR: 20 (03-13-24 @ 17:00)  SpO2: 94% (03-13-24 @ 17:17)    Mode: AC/ CMV (Assist Control/ Continuous Mandatory Ventilation), RR (machine): 20, TV (machine): 400, FiO2: 100, PEEP: 6, ITime: 0.8, MAP: 9, PIP: 21    Physical Exam:  Gen: intubated and sedated  Pulm:  NO breath sounds heard on right diminished breath sound on left  CV:Tachycardic  Abd:  soft, NT, ND  Ext:  +DP b/l,       I&O's Detail                          15.5   8.95  )-----------( 108      ( 13 Mar 2024 14:50 )             42.4   03-13    134<L>  |  98  |  40.8<H>  ----------------------------<  146<H>  4.8   |  23.0  |  1.38<H>    Ca    9.4      13 Mar 2024 08:15      ABG - ( 13 Mar 2024 14:00 )  pH: 7.480 /  pCO2: 31    /  pO2: 65    / HCO3: 23    / Base Excess: -0.4  /  SaO2: 94.0 /  Lactate: x        CAPILLARY BLOOD GLUCOSE            Medications:  acetylcysteine 20%  Inhalation 4 milliLiter(s) Inhalation every 6 hours  albuterol    0.083% 2.5 milliGRAM(s) Nebulizer every 6 hours  albuterol    90 MICROgram(s) HFA Inhaler 2 Puff(s) Inhalation every 6 hours PRN  aluminum hydroxide/magnesium hydroxide/simethicone Suspension 30 milliLiter(s) Oral every 4 hours PRN  atorvastatin 20 milliGRAM(s) Oral at bedtime  azithromycin  IVPB 500 milliGRAM(s) IV Intermittent every 24 hours  budesonide  80 MICROgram(s)/formoterol 4.5 MICROgram(s) Inhaler 2 Puff(s) Inhalation two times a day  enoxaparin Injectable 40 milliGRAM(s) SubCutaneous every 24 hours  fentaNYL   Infusion. 0.5 MICROgram(s)/kG/Hr IV Continuous <Continuous>  furosemide   Injectable 40 milliGRAM(s) IV Push once  guaiFENesin  milliGRAM(s) Oral every 12 hours  hydrochlorothiazide 25 milliGRAM(s) Oral daily  losartan 100 milliGRAM(s) Oral daily  methylPREDNISolone sodium succinate Injectable 40 milliGRAM(s) IV Push every 6 hours  metoprolol tartrate 75 milliGRAM(s) Oral two times a day  ondansetron Injectable 4 milliGRAM(s) IV Push every 6 hours PRN  propofol Infusion 20 MICROgram(s)/kG/Min IV Continuous <Continuous>  rocuronium Injectable 100 milliGRAM(s) IV Push once  tiotropium 2.5 MICROgram(s) Inhaler 2 Puff(s) Inhalation daily  vancomycin  IVPB 1250 milliGRAM(s) IV Intermittent once  zolpidem 5 milliGRAM(s) Oral at bedtime PRN

## 2024-03-14 NOTE — PROGRESS NOTE ADULT - REASON FOR ADMISSION
Acute hypoxic respiratory failure 2/2 obstructive endobronchial mass c/b persistent hypoxia and shock.

## 2024-03-15 ENCOUNTER — APPOINTMENT (OUTPATIENT)
Dept: THORACIC SURGERY | Facility: HOSPITAL | Age: 74
End: 2024-03-15

## 2024-03-18 ENCOUNTER — APPOINTMENT (OUTPATIENT)
Dept: HEMATOLOGY ONCOLOGY | Facility: CLINIC | Age: 74
End: 2024-03-18

## 2024-03-18 LAB
CULTURE RESULTS: SIGNIFICANT CHANGE UP
CULTURE RESULTS: SIGNIFICANT CHANGE UP
SPECIMEN SOURCE: SIGNIFICANT CHANGE UP
SPECIMEN SOURCE: SIGNIFICANT CHANGE UP

## 2024-03-19 NOTE — CHART NOTE - NSCHARTNOTEFT_GEN_A_CORE
Intraoperative bleeding not considered a complication/insignificant intervention      1.Flexible bronchoscopy. 2.Bronchial lavage. 3.Endobronchial brushing... flexible bronchoscopy was performed.  This demonstrated large mass largely occluding the right mainstem bronchus.  It appeared to be passage adjacent to the mass and aeration of the middle and lower lobe was present.  Multiple brushings were taken from the lesion and some bleeding occurred.  This was controlled with dilute epinephrine

## 2024-03-21 ENCOUNTER — APPOINTMENT (OUTPATIENT)
Dept: ORTHOPEDIC SURGERY | Facility: CLINIC | Age: 74
End: 2024-03-21

## 2024-03-25 NOTE — CHART NOTE - NSCHARTNOTEFT_GEN_A_CORE
Palliative care social work note.    Bereavement call made to patients spouse Bethanie. Yessica[port and resources offered.
Patient in extremis, not stable for CT scan. tPA given at 1945 for emergent treatment of possible PE in the setting of hemodynamic instability, persistent hypoxia, with pmhx of metastatic cancer.

## 2024-03-27 LAB
CULTURE RESULTS: ABNORMAL
SPECIMEN SOURCE: SIGNIFICANT CHANGE UP

## 2024-04-10 ENCOUNTER — APPOINTMENT (OUTPATIENT)
Dept: PULMONOLOGY | Facility: CLINIC | Age: 74
End: 2024-04-10

## 2024-04-17 LAB
CULTURE RESULTS: SIGNIFICANT CHANGE UP
SPECIMEN SOURCE: SIGNIFICANT CHANGE UP

## 2024-05-07 ENCOUNTER — APPOINTMENT (OUTPATIENT)
Dept: PULMONOLOGY | Facility: CLINIC | Age: 74
End: 2024-05-07

## (undated) DEVICE — SOL IRR POUR H2O 1000ML

## (undated) DEVICE — DRAPE XL SHEET 77X98"

## (undated) DEVICE — DRAPE 3/4 SHEET 52X76"

## (undated) DEVICE — SYR CONTROL LUER LOK 10CC

## (undated) DEVICE — GLV 7.5 PROTEXIS (WHITE)

## (undated) DEVICE — VENODYNE/SCD SLEEVE CALF MEDIUM

## (undated) DEVICE — TRAP SPECIMEN SPUTUM 40CC

## (undated) DEVICE — VALVE SUCTION EVIS 160/200/240

## (undated) DEVICE — BRUSH CYTO DISP

## (undated) DEVICE — VISITEC 4X4

## (undated) DEVICE — VALVE BIOPSY BRONCHOVIDEOSCOPE

## (undated) DEVICE — FORCEP RADIAL JAW 4 PEDIATRIC W NDL 1.8MM 2MM 160CM DISP

## (undated) DEVICE — WARMING BLANKET LOWER ADULT

## (undated) DEVICE — TUBING CONNECTING 6MM 20FT

## (undated) DEVICE — SOL IRR POUR NS 0.9% 1000ML